# Patient Record
Sex: MALE | Race: WHITE | Employment: FULL TIME | URBAN - METROPOLITAN AREA
[De-identification: names, ages, dates, MRNs, and addresses within clinical notes are randomized per-mention and may not be internally consistent; named-entity substitution may affect disease eponyms.]

---

## 2017-02-13 RX ORDER — BENAZEPRIL HYDROCHLORIDE AND HYDROCHLOROTHIAZIDE 20; 12.5 MG/1; MG/1
TABLET ORAL
Qty: 180 TABLET | Refills: 1 | Status: SHIPPED | OUTPATIENT
Start: 2017-02-13 | End: 2017-06-14 | Stop reason: SDUPTHER

## 2017-03-09 ENCOUNTER — TELEPHONE (OUTPATIENT)
Dept: FAMILY MEDICINE CLINIC | Age: 67
End: 2017-03-09

## 2017-03-20 RX ORDER — TADALAFIL 20 MG/1
TABLET ORAL
Qty: 6 TABLET | Refills: 11 | Status: ON HOLD | OUTPATIENT
Start: 2017-03-20 | End: 2018-02-09 | Stop reason: CLARIF

## 2017-06-05 DIAGNOSIS — N52.9 ERECTILE DYSFUNCTION, UNSPECIFIED ERECTILE DYSFUNCTION TYPE: Primary | ICD-10-CM

## 2017-06-05 DIAGNOSIS — E11.8 TYPE 2 DIABETES MELLITUS WITH COMPLICATION, UNSPECIFIED LONG TERM INSULIN USE STATUS: ICD-10-CM

## 2017-06-05 DIAGNOSIS — I10 ESSENTIAL HYPERTENSION: ICD-10-CM

## 2017-06-05 DIAGNOSIS — N52.9 ERECTILE DYSFUNCTION, UNSPECIFIED ERECTILE DYSFUNCTION TYPE: ICD-10-CM

## 2017-06-05 LAB
ALBUMIN SERPL-MCNC: 4.7 G/DL (ref 3.9–4.9)
ALP BLD-CCNC: 65 U/L (ref 35–104)
ALT SERPL-CCNC: 28 U/L (ref 0–41)
ANION GAP SERPL CALCULATED.3IONS-SCNC: 15 MEQ/L (ref 7–13)
AST SERPL-CCNC: 35 U/L (ref 0–40)
BASOPHILS ABSOLUTE: 0.1 K/UL (ref 0–0.2)
BASOPHILS RELATIVE PERCENT: 1 %
BILIRUB SERPL-MCNC: 0.8 MG/DL (ref 0–1.2)
BUN BLDV-MCNC: 19 MG/DL (ref 8–23)
CALCIUM SERPL-MCNC: 9.3 MG/DL (ref 8.6–10.2)
CHLORIDE BLD-SCNC: 99 MEQ/L (ref 98–107)
CO2: 26 MEQ/L (ref 22–29)
CREAT SERPL-MCNC: 1.21 MG/DL (ref 0.7–1.2)
EOSINOPHILS ABSOLUTE: 0.6 K/UL (ref 0–0.7)
EOSINOPHILS RELATIVE PERCENT: 6.6 %
GFR AFRICAN AMERICAN: >60
GFR NON-AFRICAN AMERICAN: 60
GLOBULIN: 2.2 G/DL (ref 2.3–3.5)
GLUCOSE BLD-MCNC: 176 MG/DL (ref 74–109)
HBA1C MFR BLD: 7.6 % (ref 4.8–5.9)
HCT VFR BLD CALC: 38.6 % (ref 42–52)
HEMOGLOBIN: 12.8 G/DL (ref 14–18)
LYMPHOCYTES ABSOLUTE: 1 K/UL (ref 1–4.8)
LYMPHOCYTES RELATIVE PERCENT: 12.2 %
MCH RBC QN AUTO: 31.4 PG (ref 27–31.3)
MCHC RBC AUTO-ENTMCNC: 33.3 % (ref 33–37)
MCV RBC AUTO: 94.1 FL (ref 80–100)
MONOCYTES ABSOLUTE: 1.4 K/UL (ref 0.2–0.8)
MONOCYTES RELATIVE PERCENT: 15.9 %
NEUTROPHILS ABSOLUTE: 5.5 K/UL (ref 1.4–6.5)
NEUTROPHILS RELATIVE PERCENT: 64.3 %
PDW BLD-RTO: 14.2 % (ref 11.5–14.5)
PLATELET # BLD: 313 K/UL (ref 130–400)
POTASSIUM SERPL-SCNC: 4.5 MEQ/L (ref 3.5–5.1)
PROSTATE SPECIFIC ANTIGEN: 3.55 NG/ML (ref 0–5.4)
RBC # BLD: 4.09 M/UL (ref 4.7–6.1)
SODIUM BLD-SCNC: 140 MEQ/L (ref 132–144)
TOTAL PROTEIN: 6.9 G/DL (ref 6.4–8.1)
WBC # BLD: 8.5 K/UL (ref 4.8–10.8)

## 2017-06-14 ENCOUNTER — OFFICE VISIT (OUTPATIENT)
Dept: FAMILY MEDICINE CLINIC | Age: 67
End: 2017-06-14

## 2017-06-14 VITALS
HEART RATE: 91 BPM | WEIGHT: 231.25 LBS | BODY MASS INDEX: 34.25 KG/M2 | HEIGHT: 69 IN | TEMPERATURE: 97.2 F | DIASTOLIC BLOOD PRESSURE: 94 MMHG | RESPIRATION RATE: 16 BRPM | SYSTOLIC BLOOD PRESSURE: 160 MMHG

## 2017-06-14 DIAGNOSIS — Z00.00 ANNUAL PHYSICAL EXAM: Primary | ICD-10-CM

## 2017-06-14 DIAGNOSIS — I10 ESSENTIAL HYPERTENSION: ICD-10-CM

## 2017-06-14 DIAGNOSIS — N40.1 BENIGN PROSTATIC HYPERPLASIA WITH LOWER URINARY TRACT SYMPTOMS, UNSPECIFIED MORPHOLOGY: ICD-10-CM

## 2017-06-14 DIAGNOSIS — E78.2 MIXED HYPERLIPIDEMIA: ICD-10-CM

## 2017-06-14 DIAGNOSIS — J01.01 ACUTE RECURRENT MAXILLARY SINUSITIS: ICD-10-CM

## 2017-06-14 DIAGNOSIS — E11.9 TYPE 2 DIABETES MELLITUS WITHOUT COMPLICATION, WITHOUT LONG-TERM CURRENT USE OF INSULIN (HCC): ICD-10-CM

## 2017-06-14 PROCEDURE — 99397 PER PM REEVAL EST PAT 65+ YR: CPT | Performed by: FAMILY MEDICINE

## 2017-06-14 RX ORDER — BENAZEPRIL HYDROCHLORIDE AND HYDROCHLOROTHIAZIDE 20; 12.5 MG/1; MG/1
TABLET ORAL
Qty: 90 TABLET | Refills: 1 | Status: SHIPPED | OUTPATIENT
Start: 2017-06-14 | End: 2017-11-21

## 2017-06-14 RX ORDER — CEFDINIR 300 MG/1
600 CAPSULE ORAL DAILY
Qty: 20 CAPSULE | Refills: 0 | Status: SHIPPED | OUTPATIENT
Start: 2017-06-14 | End: 2017-06-24

## 2017-06-14 RX ORDER — ATORVASTATIN CALCIUM 10 MG/1
TABLET, FILM COATED ORAL
Qty: 90 TABLET | Refills: 1 | Status: SHIPPED | OUTPATIENT
Start: 2017-06-14 | End: 2018-03-29 | Stop reason: SDUPTHER

## 2017-06-14 RX ORDER — AMLODIPINE BESYLATE 5 MG/1
5 TABLET ORAL DAILY
Qty: 30 TABLET | Refills: 3 | Status: SHIPPED | OUTPATIENT
Start: 2017-06-14 | End: 2017-07-13 | Stop reason: SDUPTHER

## 2017-06-29 ENCOUNTER — NURSE ONLY (OUTPATIENT)
Dept: FAMILY MEDICINE CLINIC | Age: 67
End: 2017-06-29

## 2017-06-29 DIAGNOSIS — I10 ESSENTIAL HYPERTENSION: Primary | ICD-10-CM

## 2017-07-13 ENCOUNTER — OFFICE VISIT (OUTPATIENT)
Dept: FAMILY MEDICINE CLINIC | Age: 67
End: 2017-07-13

## 2017-07-13 VITALS
TEMPERATURE: 96.5 F | BODY MASS INDEX: 33.06 KG/M2 | HEART RATE: 87 BPM | WEIGHT: 223.19 LBS | SYSTOLIC BLOOD PRESSURE: 130 MMHG | DIASTOLIC BLOOD PRESSURE: 76 MMHG | OXYGEN SATURATION: 97 % | HEIGHT: 69 IN

## 2017-07-13 DIAGNOSIS — E11.9 TYPE 2 DIABETES MELLITUS WITHOUT COMPLICATION, WITHOUT LONG-TERM CURRENT USE OF INSULIN (HCC): ICD-10-CM

## 2017-07-13 DIAGNOSIS — I10 ESSENTIAL HYPERTENSION: Primary | ICD-10-CM

## 2017-07-13 LAB
CREATININE URINE: 222.5 MG/DL
MICROALBUMIN UR-MCNC: <1.2 MG/DL
MICROALBUMIN/CREAT UR-RTO: NORMAL MG/G (ref 0–30)

## 2017-07-13 PROCEDURE — 3046F HEMOGLOBIN A1C LEVEL >9.0%: CPT | Performed by: FAMILY MEDICINE

## 2017-07-13 PROCEDURE — 3017F COLORECTAL CA SCREEN DOC REV: CPT | Performed by: FAMILY MEDICINE

## 2017-07-13 PROCEDURE — 1036F TOBACCO NON-USER: CPT | Performed by: FAMILY MEDICINE

## 2017-07-13 PROCEDURE — 99213 OFFICE O/P EST LOW 20 MIN: CPT | Performed by: FAMILY MEDICINE

## 2017-07-13 PROCEDURE — G8417 CALC BMI ABV UP PARAM F/U: HCPCS | Performed by: FAMILY MEDICINE

## 2017-07-13 PROCEDURE — 4040F PNEUMOC VAC/ADMIN/RCVD: CPT | Performed by: FAMILY MEDICINE

## 2017-07-13 PROCEDURE — G8427 DOCREV CUR MEDS BY ELIG CLIN: HCPCS | Performed by: FAMILY MEDICINE

## 2017-07-13 PROCEDURE — 1123F ACP DISCUSS/DSCN MKR DOCD: CPT | Performed by: FAMILY MEDICINE

## 2017-07-13 RX ORDER — AMLODIPINE BESYLATE 5 MG/1
5 TABLET ORAL DAILY
Qty: 90 TABLET | Refills: 1 | Status: SHIPPED | OUTPATIENT
Start: 2017-07-13 | End: 2018-02-18 | Stop reason: SDUPTHER

## 2017-07-13 ASSESSMENT — PATIENT HEALTH QUESTIONNAIRE - PHQ9
1. LITTLE INTEREST OR PLEASURE IN DOING THINGS: 0
SUM OF ALL RESPONSES TO PHQ9 QUESTIONS 1 & 2: 0
SUM OF ALL RESPONSES TO PHQ QUESTIONS 1-9: 0
2. FEELING DOWN, DEPRESSED OR HOPELESS: 0

## 2017-11-09 ENCOUNTER — NURSE ONLY (OUTPATIENT)
Dept: PRIMARY CARE CLINIC | Age: 67
End: 2017-11-09

## 2017-11-09 DIAGNOSIS — E11.9 TYPE 2 DIABETES MELLITUS WITHOUT COMPLICATION, WITHOUT LONG-TERM CURRENT USE OF INSULIN (HCC): ICD-10-CM

## 2017-11-09 DIAGNOSIS — I10 ESSENTIAL HYPERTENSION: Primary | ICD-10-CM

## 2017-11-09 DIAGNOSIS — I10 ESSENTIAL HYPERTENSION: ICD-10-CM

## 2017-11-09 LAB
ANION GAP SERPL CALCULATED.3IONS-SCNC: 20 MEQ/L (ref 7–13)
BUN BLDV-MCNC: 19 MG/DL (ref 8–23)
CALCIUM SERPL-MCNC: 9.9 MG/DL (ref 8.6–10.2)
CHLORIDE BLD-SCNC: 99 MEQ/L (ref 98–107)
CO2: 25 MEQ/L (ref 22–29)
CREAT SERPL-MCNC: 1.31 MG/DL (ref 0.7–1.2)
GFR AFRICAN AMERICAN: >60
GFR NON-AFRICAN AMERICAN: 54.6
GLUCOSE BLD-MCNC: 129 MG/DL (ref 74–109)
HBA1C MFR BLD: 6.5 % (ref 4.8–5.9)
POTASSIUM SERPL-SCNC: 5.1 MEQ/L (ref 3.5–5.1)
SODIUM BLD-SCNC: 144 MEQ/L (ref 132–144)

## 2017-11-21 ENCOUNTER — OFFICE VISIT (OUTPATIENT)
Dept: FAMILY MEDICINE CLINIC | Age: 67
End: 2017-11-21

## 2017-11-21 VITALS
TEMPERATURE: 97.7 F | HEART RATE: 102 BPM | BODY MASS INDEX: 32.18 KG/M2 | RESPIRATION RATE: 12 BRPM | WEIGHT: 217.25 LBS | HEIGHT: 69 IN | SYSTOLIC BLOOD PRESSURE: 136 MMHG | OXYGEN SATURATION: 97 % | DIASTOLIC BLOOD PRESSURE: 84 MMHG

## 2017-11-21 DIAGNOSIS — E78.00 PURE HYPERCHOLESTEROLEMIA: ICD-10-CM

## 2017-11-21 DIAGNOSIS — E11.9 TYPE 2 DIABETES MELLITUS WITHOUT COMPLICATION, WITHOUT LONG-TERM CURRENT USE OF INSULIN (HCC): Primary | ICD-10-CM

## 2017-11-21 DIAGNOSIS — I10 ESSENTIAL HYPERTENSION: ICD-10-CM

## 2017-11-21 PROCEDURE — G8427 DOCREV CUR MEDS BY ELIG CLIN: HCPCS | Performed by: FAMILY MEDICINE

## 2017-11-21 PROCEDURE — 1036F TOBACCO NON-USER: CPT | Performed by: FAMILY MEDICINE

## 2017-11-21 PROCEDURE — 1123F ACP DISCUSS/DSCN MKR DOCD: CPT | Performed by: FAMILY MEDICINE

## 2017-11-21 PROCEDURE — 3017F COLORECTAL CA SCREEN DOC REV: CPT | Performed by: FAMILY MEDICINE

## 2017-11-21 PROCEDURE — 4040F PNEUMOC VAC/ADMIN/RCVD: CPT | Performed by: FAMILY MEDICINE

## 2017-11-21 PROCEDURE — 99213 OFFICE O/P EST LOW 20 MIN: CPT | Performed by: FAMILY MEDICINE

## 2017-11-21 PROCEDURE — 3044F HG A1C LEVEL LT 7.0%: CPT | Performed by: FAMILY MEDICINE

## 2017-11-21 PROCEDURE — G8417 CALC BMI ABV UP PARAM F/U: HCPCS | Performed by: FAMILY MEDICINE

## 2017-11-21 PROCEDURE — G8484 FLU IMMUNIZE NO ADMIN: HCPCS | Performed by: FAMILY MEDICINE

## 2017-11-21 RX ORDER — BENAZEPRIL HYDROCHLORIDE 20 MG/1
20 TABLET ORAL DAILY
Qty: 90 TABLET | Refills: 1 | Status: SHIPPED | OUTPATIENT
Start: 2017-11-21 | End: 2018-05-18 | Stop reason: SDUPTHER

## 2017-12-18 DIAGNOSIS — I10 ESSENTIAL HYPERTENSION: ICD-10-CM

## 2017-12-18 LAB
ANION GAP SERPL CALCULATED.3IONS-SCNC: 14 MEQ/L (ref 7–13)
BUN BLDV-MCNC: 20 MG/DL (ref 8–23)
CALCIUM SERPL-MCNC: 9.7 MG/DL (ref 8.6–10.2)
CHLORIDE BLD-SCNC: 102 MEQ/L (ref 98–107)
CO2: 26 MEQ/L (ref 22–29)
CREAT SERPL-MCNC: 1.15 MG/DL (ref 0.7–1.2)
GFR AFRICAN AMERICAN: >60
GFR NON-AFRICAN AMERICAN: >60
GLUCOSE BLD-MCNC: 139 MG/DL (ref 74–109)
POTASSIUM SERPL-SCNC: 5 MEQ/L (ref 3.5–5.1)
SODIUM BLD-SCNC: 142 MEQ/L (ref 132–144)

## 2018-01-26 ENCOUNTER — TELEPHONE (OUTPATIENT)
Dept: ENDOSCOPY | Age: 68
End: 2018-01-26

## 2018-02-09 ENCOUNTER — HOSPITAL ENCOUNTER (OUTPATIENT)
Age: 68
Setting detail: OUTPATIENT SURGERY
Discharge: HOME | End: 2018-02-09
Attending: SPECIALIST | Admitting: SPECIALIST
Payer: COMMERCIAL

## 2018-02-09 ENCOUNTER — ANESTHESIA EVENT (OUTPATIENT)
Dept: ENDOSCOPY | Age: 68
End: 2018-02-09
Payer: COMMERCIAL

## 2018-02-09 ENCOUNTER — ANESTHESIA (OUTPATIENT)
Dept: ENDOSCOPY | Age: 68
End: 2018-02-09
Payer: COMMERCIAL

## 2018-02-09 VITALS
DIASTOLIC BLOOD PRESSURE: 79 MMHG | TEMPERATURE: 98.2 F | SYSTOLIC BLOOD PRESSURE: 134 MMHG | WEIGHT: 217 LBS | BODY MASS INDEX: 32.14 KG/M2 | RESPIRATION RATE: 16 BRPM | OXYGEN SATURATION: 97 % | HEART RATE: 82 BPM | HEIGHT: 69 IN

## 2018-02-09 VITALS
OXYGEN SATURATION: 98 % | DIASTOLIC BLOOD PRESSURE: 55 MMHG | RESPIRATION RATE: 13 BRPM | SYSTOLIC BLOOD PRESSURE: 98 MMHG

## 2018-02-09 PROCEDURE — 6360000002 HC RX W HCPCS

## 2018-02-09 PROCEDURE — 3609027000 HC COLONOSCOPY: Performed by: SPECIALIST

## 2018-02-09 PROCEDURE — 7100000010 HC PHASE II RECOVERY - FIRST 15 MIN: Performed by: SPECIALIST

## 2018-02-09 PROCEDURE — 3700000000 HC ANESTHESIA ATTENDED CARE: Performed by: SPECIALIST

## 2018-02-09 PROCEDURE — 7100000011 HC PHASE II RECOVERY - ADDTL 15 MIN: Performed by: SPECIALIST

## 2018-02-09 PROCEDURE — 3700000001 HC ADD 15 MINUTES (ANESTHESIA): Performed by: SPECIALIST

## 2018-02-09 RX ORDER — ONDANSETRON 2 MG/ML
4 INJECTION INTRAMUSCULAR; INTRAVENOUS
Status: DISCONTINUED | OUTPATIENT
Start: 2018-02-09 | End: 2018-02-09 | Stop reason: HOSPADM

## 2018-02-09 RX ORDER — SODIUM CHLORIDE 0.9 % (FLUSH) 0.9 %
10 SYRINGE (ML) INJECTION EVERY 12 HOURS SCHEDULED
Status: DISCONTINUED | OUTPATIENT
Start: 2018-02-09 | End: 2018-02-09 | Stop reason: HOSPADM

## 2018-02-09 RX ORDER — SODIUM CHLORIDE 9 MG/ML
INJECTION, SOLUTION INTRAVENOUS CONTINUOUS
Status: DISCONTINUED | OUTPATIENT
Start: 2018-02-09 | End: 2018-02-09 | Stop reason: HOSPADM

## 2018-02-09 RX ORDER — LIDOCAINE HYDROCHLORIDE 10 MG/ML
1 INJECTION, SOLUTION EPIDURAL; INFILTRATION; INTRACAUDAL; PERINEURAL
Status: DISCONTINUED | OUTPATIENT
Start: 2018-02-09 | End: 2018-02-09 | Stop reason: HOSPADM

## 2018-02-09 RX ORDER — PROPOFOL 10 MG/ML
INJECTION, EMULSION INTRAVENOUS CONTINUOUS PRN
Status: DISCONTINUED | OUTPATIENT
Start: 2018-02-09 | End: 2018-02-09 | Stop reason: SDUPTHER

## 2018-02-09 RX ORDER — SODIUM CHLORIDE 0.9 % (FLUSH) 0.9 %
10 SYRINGE (ML) INJECTION PRN
Status: DISCONTINUED | OUTPATIENT
Start: 2018-02-09 | End: 2018-02-09 | Stop reason: HOSPADM

## 2018-02-09 RX ADMIN — PROPOFOL 100 MCG/KG/MIN: 10 INJECTION, EMULSION INTRAVENOUS at 08:09

## 2018-02-09 NOTE — ANESTHESIA PRE PROCEDURE
PF 1 % injection 1 mL  1 mL Intradermal Once PRN Hattie Lofton MD           Allergies:  No Known Allergies    Problem List:    Patient Active Problem List   Diagnosis Code    Hyperlipidemia E78.5    Erectile dysfunction N52.9    Benign prostatic hyperplasia N40.0    Essential hypertension I10    Type 2 diabetes mellitus without complication, without long-term current use of insulin [facility] E11.9       Past Medical History:        Diagnosis Date    BPH (benign prostatic hypertrophy)     Erectile dysfunction     Hyperlipidemia     Hypertension     Type II or unspecified type diabetes mellitus without mention of complication, not stated as uncontrolled        Past Surgical History:        Procedure Laterality Date    FRACTURE SURGERY      right wrist    WISDOM TOOTH EXTRACTION         Social History:    Social History   Substance Use Topics    Smoking status: Former Smoker     Quit date: 1978    Smokeless tobacco: Never Used    Alcohol use No                                Counseling given: Not Answered      Vital Signs (Current):   Vitals:    02/09/18 0717   BP: (!) 194/96   Pulse: 108   Resp: 18   Temp: 36.8 °C (98.2 °F)   TempSrc: Temporal   SpO2: 97%   Weight: 217 lb (98.4 kg)   Height: 5' 9\" (1.753 m)                                              BP Readings from Last 3 Encounters:   02/09/18 (!) 194/96   11/21/17 136/84   07/13/17 130/76       NPO Status: Time of last liquid consumption: 2300                        Time of last solid consumption: 2000                        Date of last liquid consumption: 02/08/18                        Date of last solid food consumption: 02/07/18    BMI:   Wt Readings from Last 3 Encounters:   02/09/18 217 lb (98.4 kg)   11/21/17 217 lb 4 oz (98.5 kg)   07/13/17 223 lb 3 oz (101.2 kg)     Body mass index is 32.05 kg/m².     CBC:   Lab Results   Component Value Date    WBC 8.5 06/05/2017    RBC 4.09 06/05/2017    HGB 12.8 06/05/2017    HCT 38.6 06/05/2017    MCV

## 2018-02-19 RX ORDER — AMLODIPINE BESYLATE 5 MG/1
TABLET ORAL
Qty: 30 TABLET | Refills: 2 | Status: SHIPPED | OUTPATIENT
Start: 2018-02-19 | End: 2018-05-18 | Stop reason: SDUPTHER

## 2018-02-26 ENCOUNTER — OFFICE VISIT (OUTPATIENT)
Dept: FAMILY MEDICINE CLINIC | Age: 68
End: 2018-02-26
Payer: COMMERCIAL

## 2018-02-26 VITALS
OXYGEN SATURATION: 96 % | RESPIRATION RATE: 12 BRPM | HEART RATE: 105 BPM | DIASTOLIC BLOOD PRESSURE: 84 MMHG | HEIGHT: 69 IN | TEMPERATURE: 98.1 F | BODY MASS INDEX: 33.62 KG/M2 | SYSTOLIC BLOOD PRESSURE: 134 MMHG | WEIGHT: 227 LBS

## 2018-02-26 DIAGNOSIS — L24.1 IRRITANT CONTACT DERMATITIS DUE TO OILS: ICD-10-CM

## 2018-02-26 DIAGNOSIS — I10 ESSENTIAL HYPERTENSION: Primary | ICD-10-CM

## 2018-02-26 DIAGNOSIS — E78.00 PURE HYPERCHOLESTEROLEMIA: ICD-10-CM

## 2018-02-26 PROCEDURE — 96372 THER/PROPH/DIAG INJ SC/IM: CPT | Performed by: FAMILY MEDICINE

## 2018-02-26 PROCEDURE — 99213 OFFICE O/P EST LOW 20 MIN: CPT | Performed by: FAMILY MEDICINE

## 2018-02-26 RX ORDER — DESOXIMETASONE 2.5 MG/G
CREAM TOPICAL
Qty: 30 G | Refills: 2 | Status: SHIPPED | OUTPATIENT
Start: 2018-02-26 | End: 2019-01-16 | Stop reason: ALTCHOICE

## 2018-02-26 RX ORDER — TRIAMCINOLONE ACETONIDE 40 MG/ML
80 INJECTION, SUSPENSION INTRA-ARTICULAR; INTRAMUSCULAR ONCE
Status: COMPLETED | OUTPATIENT
Start: 2018-02-26 | End: 2018-02-26

## 2018-02-26 RX ORDER — CEPHALEXIN 500 MG/1
500 CAPSULE ORAL 3 TIMES DAILY
Qty: 30 CAPSULE | Refills: 0 | Status: SHIPPED | OUTPATIENT
Start: 2018-02-26 | End: 2018-06-27 | Stop reason: ALTCHOICE

## 2018-02-26 RX ADMIN — TRIAMCINOLONE ACETONIDE 80 MG: 40 INJECTION, SUSPENSION INTRA-ARTICULAR; INTRAMUSCULAR at 17:16

## 2018-02-26 NOTE — PROGRESS NOTES
neg. Nares clear, no drainage noted  Neck supple/ no adenopathy   HEART:  RRR without murmur/ no carotid bruits  LUNGS:  Clear to auscultation bilaterally, no wheeze or rhonchi noted  THYROID: neg masses or nodularity  ABDOMEN:  Soft x4. Bowel sounds positive. No masses or organomegaly,  Negative tenderness, guarding or rebound. EXTR:  Without edema./ good pulses bilat    Neurologic exam unremarkable. DTRs in upper and lower extremities within normal limits. Full strength noted    Skin-  positive papules in between his fingers bilateral hands also some erythema and some oozing consistent with contact       Assessment:      1. Pure hypercholesterolemia     2. Essential hypertension     3. Irritant contact dermatitis due to oils               Plan:        Orders Placed This Encounter   Medications    triamcinolone acetonide (KENALOG-40) injection 80 mg    desoximetasone (TOPICORT) 0.25 % cream     Sig: Apply topically 2 times daily. Dispense:  30 g     Refill:  2    cephALEXin (KEFLEX) 500 MG capsule     Sig: Take 1 capsule by mouth 3 times daily     Dispense:  30 capsule     Refill:  0     No orders of the defined types were placed in this encounter.           Health Maintenance Due   Topic Date Due    AAA screen  1950    Hepatitis C screen  1950    Shingles Vaccine (1 of 2 - 2 Dose Series) 03/24/2001    DTaP/Tdap/Td vaccine (1 - Tdap) 12/11/2008    Pneumococcal low/med risk (1 of 2 - PCV13) 03/24/2016    Lipid screen  06/15/2017    Diabetic foot exam  06/22/2017    Colon Cancer Screen FIT/FOBT  07/01/2017    Flu vaccine (1) 09/01/2017             Controlled Substances Monitoring:              He will go ahead and keep this area clean and dry use the Topicort steroid twice a day we given a shot here today to get things better because of a mild secondary infection we'll start Keflex                       If anything worsens or changes please call us at once , follow-up in the office as

## 2018-03-29 RX ORDER — ATORVASTATIN CALCIUM 10 MG/1
TABLET, FILM COATED ORAL
Qty: 30 TABLET | Refills: 0 | Status: SHIPPED | OUTPATIENT
Start: 2018-03-29 | End: 2018-05-08 | Stop reason: SDUPTHER

## 2018-05-09 RX ORDER — ATORVASTATIN CALCIUM 10 MG/1
TABLET, FILM COATED ORAL
Qty: 30 TABLET | Refills: 2 | Status: SHIPPED | OUTPATIENT
Start: 2018-05-09 | End: 2018-06-05 | Stop reason: SDUPTHER

## 2018-05-18 ENCOUNTER — TELEPHONE (OUTPATIENT)
Dept: FAMILY MEDICINE CLINIC | Age: 68
End: 2018-05-18

## 2018-05-19 RX ORDER — BENAZEPRIL HYDROCHLORIDE 20 MG/1
20 TABLET ORAL DAILY
Qty: 90 TABLET | Refills: 0 | Status: SHIPPED | OUTPATIENT
Start: 2018-05-19 | End: 2018-10-10 | Stop reason: SDUPTHER

## 2018-05-19 RX ORDER — AMLODIPINE BESYLATE 5 MG/1
TABLET ORAL
Qty: 90 TABLET | Refills: 0 | Status: SHIPPED | OUTPATIENT
Start: 2018-05-19 | End: 2018-10-22 | Stop reason: SDUPTHER

## 2018-05-23 ENCOUNTER — TELEPHONE (OUTPATIENT)
Dept: FAMILY MEDICINE CLINIC | Age: 68
End: 2018-05-23

## 2018-05-31 DIAGNOSIS — E11.9 TYPE 2 DIABETES MELLITUS WITHOUT COMPLICATION, WITHOUT LONG-TERM CURRENT USE OF INSULIN (HCC): Primary | ICD-10-CM

## 2018-06-05 DIAGNOSIS — E11.9 TYPE 2 DIABETES MELLITUS WITHOUT COMPLICATION, WITHOUT LONG-TERM CURRENT USE OF INSULIN (HCC): ICD-10-CM

## 2018-06-06 RX ORDER — ATORVASTATIN CALCIUM 10 MG/1
TABLET, FILM COATED ORAL
Qty: 90 TABLET | Refills: 2 | Status: SHIPPED | OUTPATIENT
Start: 2018-06-06 | End: 2019-01-31 | Stop reason: SDUPTHER

## 2018-06-27 ENCOUNTER — OFFICE VISIT (OUTPATIENT)
Dept: FAMILY MEDICINE CLINIC | Age: 68
End: 2018-06-27
Payer: COMMERCIAL

## 2018-06-27 VITALS
TEMPERATURE: 97.9 F | BODY MASS INDEX: 33.41 KG/M2 | SYSTOLIC BLOOD PRESSURE: 136 MMHG | OXYGEN SATURATION: 98 % | HEART RATE: 90 BPM | RESPIRATION RATE: 12 BRPM | HEIGHT: 69 IN | DIASTOLIC BLOOD PRESSURE: 84 MMHG | WEIGHT: 225.56 LBS

## 2018-06-27 DIAGNOSIS — Z12.5 SCREENING PSA (PROSTATE SPECIFIC ANTIGEN): ICD-10-CM

## 2018-06-27 DIAGNOSIS — E78.00 PURE HYPERCHOLESTEROLEMIA: ICD-10-CM

## 2018-06-27 DIAGNOSIS — I10 ESSENTIAL HYPERTENSION: ICD-10-CM

## 2018-06-27 DIAGNOSIS — Z00.00 ANNUAL PHYSICAL EXAM: Primary | ICD-10-CM

## 2018-06-27 DIAGNOSIS — Z23 NEED FOR TDAP VACCINATION: ICD-10-CM

## 2018-06-27 DIAGNOSIS — E11.9 TYPE 2 DIABETES MELLITUS WITHOUT COMPLICATION, WITHOUT LONG-TERM CURRENT USE OF INSULIN (HCC): ICD-10-CM

## 2018-06-27 PROCEDURE — 90715 TDAP VACCINE 7 YRS/> IM: CPT | Performed by: FAMILY MEDICINE

## 2018-06-27 PROCEDURE — 99397 PER PM REEVAL EST PAT 65+ YR: CPT | Performed by: FAMILY MEDICINE

## 2018-06-27 PROCEDURE — 90471 IMMUNIZATION ADMIN: CPT | Performed by: FAMILY MEDICINE

## 2018-07-19 DIAGNOSIS — Z12.5 SCREENING PSA (PROSTATE SPECIFIC ANTIGEN): ICD-10-CM

## 2018-07-19 DIAGNOSIS — Z00.00 ANNUAL PHYSICAL EXAM: ICD-10-CM

## 2018-07-19 DIAGNOSIS — E11.9 TYPE 2 DIABETES MELLITUS WITHOUT COMPLICATION, WITHOUT LONG-TERM CURRENT USE OF INSULIN (HCC): ICD-10-CM

## 2018-07-19 LAB
ALBUMIN SERPL-MCNC: 4.4 G/DL (ref 3.9–4.9)
ALP BLD-CCNC: 58 U/L (ref 35–104)
ALT SERPL-CCNC: 20 U/L (ref 0–41)
ANION GAP SERPL CALCULATED.3IONS-SCNC: 12 MEQ/L (ref 7–13)
AST SERPL-CCNC: 19 U/L (ref 0–40)
BASOPHILS ABSOLUTE: 0.1 K/UL (ref 0–0.2)
BASOPHILS RELATIVE PERCENT: 1.2 %
BILIRUB SERPL-MCNC: 0.6 MG/DL (ref 0–1.2)
BUN BLDV-MCNC: 15 MG/DL (ref 8–23)
CALCIUM SERPL-MCNC: 9.3 MG/DL (ref 8.6–10.2)
CHLORIDE BLD-SCNC: 101 MEQ/L (ref 98–107)
CHOLESTEROL, TOTAL: 183 MG/DL (ref 0–199)
CO2: 27 MEQ/L (ref 22–29)
CREAT SERPL-MCNC: 1.05 MG/DL (ref 0.7–1.2)
CREATININE URINE: 127.9 MG/DL
EOSINOPHILS ABSOLUTE: 0.2 K/UL (ref 0–0.7)
EOSINOPHILS RELATIVE PERCENT: 3.4 %
GFR AFRICAN AMERICAN: >60
GFR NON-AFRICAN AMERICAN: >60
GLOBULIN: 2.7 G/DL (ref 2.3–3.5)
GLUCOSE BLD-MCNC: 131 MG/DL (ref 74–109)
HBA1C MFR BLD: 6.3 % (ref 4.8–5.9)
HCT VFR BLD CALC: 41.4 % (ref 42–52)
HDLC SERPL-MCNC: 64 MG/DL (ref 40–59)
HEMOGLOBIN: 13.8 G/DL (ref 14–18)
LDL CHOLESTEROL CALCULATED: 95 MG/DL (ref 0–129)
LYMPHOCYTES ABSOLUTE: 1.2 K/UL (ref 1–4.8)
LYMPHOCYTES RELATIVE PERCENT: 19 %
MCH RBC QN AUTO: 32.2 PG (ref 27–31.3)
MCHC RBC AUTO-ENTMCNC: 33.4 % (ref 33–37)
MCV RBC AUTO: 96.2 FL (ref 80–100)
MICROALBUMIN UR-MCNC: <1.2 MG/DL
MICROALBUMIN/CREAT UR-RTO: NORMAL MG/G (ref 0–30)
MONOCYTES ABSOLUTE: 0.6 K/UL (ref 0.2–0.8)
MONOCYTES RELATIVE PERCENT: 9.8 %
NEUTROPHILS ABSOLUTE: 4.2 K/UL (ref 1.4–6.5)
NEUTROPHILS RELATIVE PERCENT: 66.6 %
PDW BLD-RTO: 13.4 % (ref 11.5–14.5)
PLATELET # BLD: 343 K/UL (ref 130–400)
POTASSIUM SERPL-SCNC: 4.7 MEQ/L (ref 3.5–5.1)
PROSTATE SPECIFIC ANTIGEN: 4.96 NG/ML (ref 0–5.4)
RBC # BLD: 4.31 M/UL (ref 4.7–6.1)
SODIUM BLD-SCNC: 140 MEQ/L (ref 132–144)
TOTAL PROTEIN: 7.1 G/DL (ref 6.4–8.1)
TRIGL SERPL-MCNC: 121 MG/DL (ref 0–200)
WBC # BLD: 6.3 K/UL (ref 4.8–10.8)

## 2018-10-10 DIAGNOSIS — I10 ESSENTIAL HYPERTENSION: Primary | ICD-10-CM

## 2018-10-11 RX ORDER — BENAZEPRIL HYDROCHLORIDE 20 MG/1
TABLET ORAL
Qty: 90 TABLET | Refills: 0 | Status: SHIPPED | OUTPATIENT
Start: 2018-10-11 | End: 2019-01-31 | Stop reason: SDUPTHER

## 2018-10-22 RX ORDER — AMLODIPINE BESYLATE 5 MG/1
TABLET ORAL
Qty: 90 TABLET | Refills: 0 | Status: SHIPPED | OUTPATIENT
Start: 2018-10-22 | End: 2019-01-16 | Stop reason: DRUGHIGH

## 2018-12-06 RX ORDER — LANCETS
EACH MISCELLANEOUS
Qty: 100 EACH | Refills: 11 | Status: SHIPPED | OUTPATIENT
Start: 2018-12-06

## 2018-12-10 NOTE — TELEPHONE ENCOUNTER
Pt is calling because he needs a rx for his One touch ultra soft test strips sent to [facility] (tests 1-2 times per day)  The lancets were sent to [facility] but he needs the test strips sent to [facility]        Medication: one touch ultra test strips    Last Office Visit: 6/27/18    Last Labs: 7/19/18    Last Filled: 7/13/17

## 2019-01-16 ENCOUNTER — OFFICE VISIT (OUTPATIENT)
Dept: FAMILY MEDICINE CLINIC | Age: 69
End: 2019-01-16
Payer: MEDICARE

## 2019-01-16 VITALS
RESPIRATION RATE: 18 BRPM | BODY MASS INDEX: 33.43 KG/M2 | OXYGEN SATURATION: 96 % | SYSTOLIC BLOOD PRESSURE: 168 MMHG | HEART RATE: 86 BPM | WEIGHT: 225.7 LBS | TEMPERATURE: 97.8 F | DIASTOLIC BLOOD PRESSURE: 78 MMHG | HEIGHT: 69 IN

## 2019-01-16 DIAGNOSIS — R05.9 COUGH: ICD-10-CM

## 2019-01-16 DIAGNOSIS — J98.8 CONGESTION OF UPPER AIRWAY: Primary | ICD-10-CM

## 2019-01-16 DIAGNOSIS — J01.90 ACUTE BACTERIAL SINUSITIS: ICD-10-CM

## 2019-01-16 DIAGNOSIS — B96.89 ACUTE BACTERIAL SINUSITIS: ICD-10-CM

## 2019-01-16 PROCEDURE — 99213 OFFICE O/P EST LOW 20 MIN: CPT | Performed by: FAMILY MEDICINE

## 2019-01-16 RX ORDER — CODEINE PHOSPHATE AND GUAIFENESIN 10; 100 MG/5ML; MG/5ML
10 SOLUTION ORAL 4 TIMES DAILY PRN
Qty: 240 ML | Refills: 1 | Status: SHIPPED | OUTPATIENT
Start: 2019-01-16 | End: 2019-01-30

## 2019-01-16 RX ORDER — AMLODIPINE BESYLATE 10 MG/1
TABLET ORAL
Qty: 90 TABLET | Refills: 1 | Status: SHIPPED
Start: 2019-01-16 | End: 2019-01-31 | Stop reason: SDUPTHER

## 2019-01-16 RX ORDER — AMOXICILLIN 875 MG/1
875 TABLET, COATED ORAL 2 TIMES DAILY
Qty: 20 TABLET | Refills: 0 | Status: SHIPPED | OUTPATIENT
Start: 2019-01-16 | End: 2019-01-26

## 2019-01-16 ASSESSMENT — PATIENT HEALTH QUESTIONNAIRE - PHQ9
1. LITTLE INTEREST OR PLEASURE IN DOING THINGS: 0
SUM OF ALL RESPONSES TO PHQ9 QUESTIONS 1 & 2: 0
SUM OF ALL RESPONSES TO PHQ QUESTIONS 1-9: 0
2. FEELING DOWN, DEPRESSED OR HOPELESS: 0
SUM OF ALL RESPONSES TO PHQ QUESTIONS 1-9: 0

## 2019-01-30 ENCOUNTER — NURSE ONLY (OUTPATIENT)
Dept: FAMILY MEDICINE CLINIC | Age: 69
End: 2019-01-30
Payer: MEDICARE

## 2019-01-30 ENCOUNTER — TELEPHONE (OUTPATIENT)
Dept: FAMILY MEDICINE CLINIC | Age: 69
End: 2019-01-30

## 2019-01-30 DIAGNOSIS — E11.9 TYPE 2 DIABETES MELLITUS WITHOUT COMPLICATION, WITHOUT LONG-TERM CURRENT USE OF INSULIN (HCC): Primary | ICD-10-CM

## 2019-01-30 LAB — HBA1C MFR BLD: 6.1 %

## 2019-01-30 PROCEDURE — 83036 HEMOGLOBIN GLYCOSYLATED A1C: CPT | Performed by: FAMILY MEDICINE

## 2019-01-31 DIAGNOSIS — I10 ESSENTIAL HYPERTENSION: ICD-10-CM

## 2019-02-01 RX ORDER — BENAZEPRIL HYDROCHLORIDE 20 MG/1
20 TABLET ORAL DAILY
Qty: 90 TABLET | Refills: 0 | Status: SHIPPED | OUTPATIENT
Start: 2019-02-01 | End: 2019-02-07 | Stop reason: SDUPTHER

## 2019-02-01 RX ORDER — AMLODIPINE BESYLATE 10 MG/1
TABLET ORAL
Qty: 90 TABLET | Refills: 0 | Status: SHIPPED | OUTPATIENT
Start: 2019-02-01 | End: 2019-02-07 | Stop reason: SDUPTHER

## 2019-02-01 RX ORDER — ATORVASTATIN CALCIUM 10 MG/1
TABLET, FILM COATED ORAL
Qty: 90 TABLET | Refills: 0 | Status: SHIPPED | OUTPATIENT
Start: 2019-02-01 | End: 2019-02-07 | Stop reason: SDUPTHER

## 2019-02-07 DIAGNOSIS — I10 ESSENTIAL HYPERTENSION: ICD-10-CM

## 2019-02-07 DIAGNOSIS — E11.9 TYPE 2 DIABETES MELLITUS WITHOUT COMPLICATION, WITHOUT LONG-TERM CURRENT USE OF INSULIN (HCC): ICD-10-CM

## 2019-02-07 RX ORDER — BENAZEPRIL HYDROCHLORIDE 20 MG/1
20 TABLET ORAL DAILY
Qty: 30 TABLET | Refills: 0 | Status: SHIPPED | OUTPATIENT
Start: 2019-02-07

## 2019-02-07 RX ORDER — ATORVASTATIN CALCIUM 10 MG/1
TABLET, FILM COATED ORAL
Qty: 30 TABLET | Refills: 0 | Status: SHIPPED | OUTPATIENT
Start: 2019-02-07

## 2019-02-07 RX ORDER — AMLODIPINE BESYLATE 10 MG/1
TABLET ORAL
Qty: 30 TABLET | Refills: 0 | Status: SHIPPED | OUTPATIENT
Start: 2019-02-07

## 2023-05-26 DIAGNOSIS — I10 PRIMARY HYPERTENSION: Primary | ICD-10-CM

## 2023-05-26 DIAGNOSIS — E11.8 CONTROLLED TYPE 2 DIABETES MELLITUS WITH COMPLICATION, WITHOUT LONG-TERM CURRENT USE OF INSULIN (MULTI): ICD-10-CM

## 2023-06-01 RX ORDER — ATORVASTATIN CALCIUM 20 MG/1
TABLET, FILM COATED ORAL
Qty: 90 TABLET | Refills: 0 | Status: SHIPPED | OUTPATIENT
Start: 2023-06-01 | End: 2023-09-05 | Stop reason: SDUPTHER

## 2023-06-01 RX ORDER — AMLODIPINE BESYLATE 10 MG/1
TABLET ORAL
Qty: 90 TABLET | Refills: 0 | Status: SHIPPED | OUTPATIENT
Start: 2023-06-01 | End: 2023-09-05 | Stop reason: SDUPTHER

## 2023-06-01 RX ORDER — METFORMIN HYDROCHLORIDE 500 MG/1
TABLET, EXTENDED RELEASE ORAL
Qty: 180 TABLET | Refills: 0 | Status: SHIPPED | OUTPATIENT
Start: 2023-06-01 | End: 2023-09-05 | Stop reason: SDUPTHER

## 2023-06-01 RX ORDER — BENAZEPRIL HYDROCHLORIDE 20 MG/1
TABLET ORAL
Qty: 180 TABLET | Refills: 0 | Status: SHIPPED | OUTPATIENT
Start: 2023-06-01 | End: 2023-09-05 | Stop reason: SDUPTHER

## 2023-06-05 ENCOUNTER — OFFICE VISIT (OUTPATIENT)
Dept: PRIMARY CARE | Facility: CLINIC | Age: 73
End: 2023-06-05
Payer: MEDICARE

## 2023-06-05 ENCOUNTER — TELEPHONE (OUTPATIENT)
Dept: PRIMARY CARE | Facility: CLINIC | Age: 73
End: 2023-06-05

## 2023-06-05 VITALS
RESPIRATION RATE: 16 BRPM | TEMPERATURE: 97.5 F | HEIGHT: 69 IN | BODY MASS INDEX: 32.58 KG/M2 | SYSTOLIC BLOOD PRESSURE: 158 MMHG | OXYGEN SATURATION: 98 % | HEART RATE: 85 BPM | DIASTOLIC BLOOD PRESSURE: 72 MMHG | WEIGHT: 220 LBS

## 2023-06-05 DIAGNOSIS — R97.20 ELEVATED PSA: ICD-10-CM

## 2023-06-05 DIAGNOSIS — E78.2 MIXED HYPERLIPIDEMIA: ICD-10-CM

## 2023-06-05 DIAGNOSIS — Z23 ENCOUNTER FOR IMMUNIZATION: ICD-10-CM

## 2023-06-05 DIAGNOSIS — E11.9 TYPE 2 DIABETES MELLITUS WITHOUT COMPLICATION, WITHOUT LONG-TERM CURRENT USE OF INSULIN (MULTI): ICD-10-CM

## 2023-06-05 DIAGNOSIS — Z00.00 MEDICARE ANNUAL WELLNESS VISIT, SUBSEQUENT: Primary | ICD-10-CM

## 2023-06-05 DIAGNOSIS — I10 PRIMARY HYPERTENSION: ICD-10-CM

## 2023-06-05 PROBLEM — E78.5 HYPERLIPIDEMIA: Status: ACTIVE | Noted: 2023-06-05

## 2023-06-05 PROBLEM — H40.9 GLAUCOMA (INCREASED EYE PRESSURE): Status: ACTIVE | Noted: 2023-06-05

## 2023-06-05 PROBLEM — S91.209A TRAUMATIC AVULSION OF NAIL PLATE OF TOE: Status: ACTIVE | Noted: 2023-06-05

## 2023-06-05 PROBLEM — M79.674 PAIN OF RIGHT GREAT TOE: Status: ACTIVE | Noted: 2023-06-05

## 2023-06-05 PROCEDURE — 1157F ADVNC CARE PLAN IN RCRD: CPT | Performed by: FAMILY MEDICINE

## 2023-06-05 PROCEDURE — 4010F ACE/ARB THERAPY RXD/TAKEN: CPT | Performed by: FAMILY MEDICINE

## 2023-06-05 PROCEDURE — 3078F DIAST BP <80 MM HG: CPT | Performed by: FAMILY MEDICINE

## 2023-06-05 PROCEDURE — 1159F MED LIST DOCD IN RCRD: CPT | Performed by: FAMILY MEDICINE

## 2023-06-05 PROCEDURE — 1160F RVW MEDS BY RX/DR IN RCRD: CPT | Performed by: FAMILY MEDICINE

## 2023-06-05 PROCEDURE — 1170F FXNL STATUS ASSESSED: CPT | Performed by: FAMILY MEDICINE

## 2023-06-05 PROCEDURE — G0439 PPPS, SUBSEQ VISIT: HCPCS | Performed by: FAMILY MEDICINE

## 2023-06-05 PROCEDURE — 3077F SYST BP >= 140 MM HG: CPT | Performed by: FAMILY MEDICINE

## 2023-06-05 PROCEDURE — 90677 PCV20 VACCINE IM: CPT | Performed by: FAMILY MEDICINE

## 2023-06-05 PROCEDURE — G0009 ADMIN PNEUMOCOCCAL VACCINE: HCPCS | Performed by: FAMILY MEDICINE

## 2023-06-05 PROCEDURE — 1036F TOBACCO NON-USER: CPT | Performed by: FAMILY MEDICINE

## 2023-06-05 RX ORDER — GUAIFENESIN AND PHENYLEPHRINE HCL 400; 10 MG/1; MG/1
1 TABLET ORAL DAILY
COMMUNITY
Start: 2021-04-02

## 2023-06-05 RX ORDER — GARLIC 200 MG
2 TABLET ORAL DAILY
COMMUNITY
Start: 2021-04-02

## 2023-06-05 RX ORDER — MULTIVITAMIN
1 TABLET ORAL DAILY
COMMUNITY
Start: 2021-04-02

## 2023-06-05 RX ORDER — LATANOPROST 50 UG/ML
1 SOLUTION/ DROPS OPHTHALMIC
COMMUNITY
Start: 2021-06-17 | End: 2023-07-03

## 2023-06-05 RX ORDER — EPINEPHRINE 0.22MG
1 AEROSOL WITH ADAPTER (ML) INHALATION DAILY
COMMUNITY
Start: 2021-04-02

## 2023-06-05 RX ORDER — NAPROXEN SODIUM 220 MG/1
TABLET ORAL
COMMUNITY
Start: 2021-04-02

## 2023-06-05 RX ORDER — TIMOLOL MALEATE 5 MG/ML
SOLUTION/ DROPS OPHTHALMIC
COMMUNITY

## 2023-06-05 RX ORDER — ASPIRIN 81 MG/1
81 TABLET ORAL
COMMUNITY

## 2023-06-05 RX ORDER — BLOOD SUGAR DIAGNOSTIC
STRIP MISCELLANEOUS
COMMUNITY
Start: 2020-09-02 | End: 2023-07-24 | Stop reason: SDUPTHER

## 2023-06-05 ASSESSMENT — ACTIVITIES OF DAILY LIVING (ADL)
TAKING_MEDICATION: INDEPENDENT
DRESSING: INDEPENDENT
DOING_HOUSEWORK: INDEPENDENT
MANAGING_FINANCES: INDEPENDENT
BATHING: INDEPENDENT
GROCERY_SHOPPING: INDEPENDENT

## 2023-06-05 ASSESSMENT — PATIENT HEALTH QUESTIONNAIRE - PHQ9
1. LITTLE INTEREST OR PLEASURE IN DOING THINGS: NOT AT ALL
SUM OF ALL RESPONSES TO PHQ9 QUESTIONS 1 AND 2: 0
2. FEELING DOWN, DEPRESSED OR HOPELESS: NOT AT ALL

## 2023-06-05 ASSESSMENT — ENCOUNTER SYMPTOMS
OCCASIONAL FEELINGS OF UNSTEADINESS: 0
LOSS OF SENSATION IN FEET: 0
DEPRESSION: 0

## 2023-06-05 NOTE — TELEPHONE ENCOUNTER
Pt is calling because he just saw you this morning and you told him to get labs done this afternoon but when he got to the lab, there were no orders in his chart. Please order what labs he will need  Thanks    Pt's # [phone] Please call patient when orders placed

## 2023-06-05 NOTE — PROGRESS NOTES
"Subjective   Patient ID: Mario Mendenhall is a 73 y.o. male who presents for Medicare Annual Wellness Visit Subsequent and Diabetes.    HPI    AWV    DM  Does Not check glucose at home   Eats a generally healthy diet  Exercises  Currently taking Metformin  Last A1c on 2/11/22 @ 6.8%  Last eye exam 2 month ago  Does not see a Podiatrist    HTN and Hyperlipidemia follow up  Denies chest pain,SOB  Denies any swelling, headaches, lightheadedness or dizziness  Does  check BP at home  Currently taking Amlodipine,Lipitor, benazpril    No other concern    Review of systems  ; Patient seen today for exam denies any problems with headaches or vision, denies any shortness of breath chest pain nausea or vomiting, no black stool no blood in the stool no heartburn type symptoms denies any problems with constipation or diarrhea, and no dysuria-type symptoms    The patient's allergies medications were reviewed with them today    The patient's social family and surgical history or also reviewed here today, along with her past medical history.     Objective     Alert and active in  no acute distress  HEENT TMs clear oropharynx negative nares clear no drainage noted neck supple  With no adenopathy   Heart regular rate and rhythm without murmur and no carotid bruits  Lungs- clear to auscultation bilaterally, no wheeze or rhonchi noted  Thyroid -negative masses or nodularity  Abdomen- soft times four quadrants , bowel sounds positive no masses or organomegaly, negative tenderness guarding or rebound  Neurological exam unremarkable- DTRs in upper and lower extremities within normal limits.   skin -no lesions noted      /72   Pulse 85   Temp 36.4 °C (97.5 °F) (Temporal)   Resp 16   Ht 1.753 m (5' 9\")   Wt 99.8 kg (220 lb)   SpO2 98%   BMI 32.49 kg/m²     No Known Allergies    Assessment/Plan   Problem List Items Addressed This Visit          Circulatory    HTN (hypertension)    Relevant Orders    CBC and Auto Differential    " Comprehensive Metabolic Panel    Lipid Panel       Genitourinary    Elevated PSA    Relevant Orders    Prostate Specific Antigen, Screen       Endocrine/Metabolic    Diabetes (CMS/[facility])    Relevant Orders    Hemoglobin A1C    Albumin , Urine Random       Other    Hyperlipidemia     Other Visit Diagnoses       Medicare annual wellness visit, subsequent    -  Primary    Encounter for immunization        Relevant Orders    Pneumococcal conjugate vaccine, 20-valent, adult (PREVNAR 20) (Completed)          Discussed patient's BMI and to institute calorie reduction and increase exercise to decrease risk of diabetes and heart disease in the future.    Discussed Shingrix with him today.  He is aware this is a 2-shot series.  He can have this administered at any drug store.    Discussed Prevnar 20 with him.  He would like to hold off on this today.    Discussed Hytrin with him to help lower BP.    Also discussed medications to manage diabetes.  Can try Jardiance, or injections.    Prevnar 20 administered.    Labs have been ordered, she/he will have these performed and we will contact her/him with results.  (CBC, CMP, Lipid, PSA, A1C, Albumin)    If anything worsens or changes please call us at once, follow up in the office as planned.    Scribe Attestation  By signing my name below, I, Macrina Gregg MA, Scribe   attest that this documentation has been prepared under the direction and in the presence of Brian Crow DO.

## 2023-06-22 ENCOUNTER — LAB (OUTPATIENT)
Dept: LAB | Facility: LAB | Age: 73
End: 2023-06-22
Payer: MEDICARE

## 2023-06-22 DIAGNOSIS — I10 PRIMARY HYPERTENSION: ICD-10-CM

## 2023-06-22 DIAGNOSIS — E11.9 TYPE 2 DIABETES MELLITUS WITHOUT COMPLICATION, WITHOUT LONG-TERM CURRENT USE OF INSULIN (MULTI): ICD-10-CM

## 2023-06-22 DIAGNOSIS — R97.20 ELEVATED PSA: ICD-10-CM

## 2023-06-22 LAB
ALANINE AMINOTRANSFERASE (SGPT) (U/L) IN SER/PLAS: 19 U/L (ref 10–52)
ALBUMIN (G/DL) IN SER/PLAS: 4.7 G/DL (ref 3.4–5)
ALBUMIN (MG/L) IN URINE: 16.8 MG/L
ALBUMIN/CREATININE (UG/MG) IN URINE: 17.3 UG/MG CRT (ref 0–30)
ALKALINE PHOSPHATASE (U/L) IN SER/PLAS: 63 U/L (ref 33–136)
ANION GAP IN SER/PLAS: 14 MMOL/L (ref 10–20)
ASPARTATE AMINOTRANSFERASE (SGOT) (U/L) IN SER/PLAS: 20 U/L (ref 9–39)
BASOPHILS (10*3/UL) IN BLOOD BY AUTOMATED COUNT: 0.09 X10E9/L (ref 0–0.1)
BASOPHILS/100 LEUKOCYTES IN BLOOD BY AUTOMATED COUNT: 1.1 % (ref 0–2)
BILIRUBIN TOTAL (MG/DL) IN SER/PLAS: 0.8 MG/DL (ref 0–1.2)
CALCIUM (MG/DL) IN SER/PLAS: 9.5 MG/DL (ref 8.6–10.3)
CARBON DIOXIDE, TOTAL (MMOL/L) IN SER/PLAS: 25 MMOL/L (ref 21–32)
CHLORIDE (MMOL/L) IN SER/PLAS: 105 MMOL/L (ref 98–107)
CHOLESTEROL (MG/DL) IN SER/PLAS: 214 MG/DL (ref 0–199)
CHOLESTEROL IN HDL (MG/DL) IN SER/PLAS: 64.9 MG/DL
CHOLESTEROL/HDL RATIO: 3.3
CREATININE (MG/DL) IN SER/PLAS: 1.45 MG/DL (ref 0.5–1.3)
CREATININE (MG/DL) IN URINE: 97.3 MG/DL (ref 20–370)
EOSINOPHILS (10*3/UL) IN BLOOD BY AUTOMATED COUNT: 0.44 X10E9/L (ref 0–0.4)
EOSINOPHILS/100 LEUKOCYTES IN BLOOD BY AUTOMATED COUNT: 5.6 % (ref 0–6)
ERYTHROCYTE DISTRIBUTION WIDTH (RATIO) BY AUTOMATED COUNT: 12.9 % (ref 11.5–14.5)
ERYTHROCYTE MEAN CORPUSCULAR HEMOGLOBIN CONCENTRATION (G/DL) BY AUTOMATED: 34.9 G/DL (ref 32–36)
ERYTHROCYTE MEAN CORPUSCULAR VOLUME (FL) BY AUTOMATED COUNT: 90 FL (ref 80–100)
ERYTHROCYTES (10*6/UL) IN BLOOD BY AUTOMATED COUNT: 4.08 X10E12/L (ref 4.5–5.9)
ESTIMATED AVERAGE GLUCOSE FOR HBA1C: 148 MG/DL
GFR MALE: 51 ML/MIN/1.73M2
GLUCOSE (MG/DL) IN SER/PLAS: 144 MG/DL (ref 74–99)
HEMATOCRIT (%) IN BLOOD BY AUTOMATED COUNT: 36.7 % (ref 41–52)
HEMOGLOBIN (G/DL) IN BLOOD: 12.8 G/DL (ref 13.5–17.5)
HEMOGLOBIN A1C/HEMOGLOBIN TOTAL IN BLOOD: 6.8 %
IMMATURE GRANULOCYTES/100 LEUKOCYTES IN BLOOD BY AUTOMATED COUNT: 0.3 % (ref 0–0.9)
LDL: 118 MG/DL (ref 0–99)
LEUKOCYTES (10*3/UL) IN BLOOD BY AUTOMATED COUNT: 7.9 X10E9/L (ref 4.4–11.3)
LYMPHOCYTES (10*3/UL) IN BLOOD BY AUTOMATED COUNT: 1.73 X10E9/L (ref 0.8–3)
LYMPHOCYTES/100 LEUKOCYTES IN BLOOD BY AUTOMATED COUNT: 21.9 % (ref 13–44)
MONOCYTES (10*3/UL) IN BLOOD BY AUTOMATED COUNT: 0.86 X10E9/L (ref 0.05–0.8)
MONOCYTES/100 LEUKOCYTES IN BLOOD BY AUTOMATED COUNT: 10.9 % (ref 2–10)
NEUTROPHILS (10*3/UL) IN BLOOD BY AUTOMATED COUNT: 4.75 X10E9/L (ref 1.6–5.5)
NEUTROPHILS/100 LEUKOCYTES IN BLOOD BY AUTOMATED COUNT: 60.2 % (ref 40–80)
PLATELETS (10*3/UL) IN BLOOD AUTOMATED COUNT: 381 X10E9/L (ref 150–450)
POTASSIUM (MMOL/L) IN SER/PLAS: 5 MMOL/L (ref 3.5–5.3)
PROSTATE SPECIFIC ANTIGEN,SCREEN: 5.69 NG/ML (ref 0–4)
PROTEIN TOTAL: 7.2 G/DL (ref 6.4–8.2)
SODIUM (MMOL/L) IN SER/PLAS: 139 MMOL/L (ref 136–145)
TRIGLYCERIDE (MG/DL) IN SER/PLAS: 155 MG/DL (ref 0–149)
UREA NITROGEN (MG/DL) IN SER/PLAS: 23 MG/DL (ref 6–23)
VLDL: 31 MG/DL (ref 0–40)

## 2023-06-22 PROCEDURE — 82570 ASSAY OF URINE CREATININE: CPT

## 2023-06-22 PROCEDURE — 80061 LIPID PANEL: CPT

## 2023-06-22 PROCEDURE — 82043 UR ALBUMIN QUANTITATIVE: CPT

## 2023-06-22 PROCEDURE — 84153 ASSAY OF PSA TOTAL: CPT

## 2023-06-22 PROCEDURE — 83036 HEMOGLOBIN GLYCOSYLATED A1C: CPT

## 2023-06-22 PROCEDURE — 36415 COLL VENOUS BLD VENIPUNCTURE: CPT

## 2023-06-22 PROCEDURE — 85025 COMPLETE CBC W/AUTO DIFF WBC: CPT

## 2023-06-22 PROCEDURE — 80053 COMPREHEN METABOLIC PANEL: CPT

## 2023-06-23 ENCOUNTER — TELEPHONE (OUTPATIENT)
Dept: PRIMARY CARE | Facility: CLINIC | Age: 73
End: 2023-06-23
Payer: MEDICARE

## 2023-06-23 NOTE — TELEPHONE ENCOUNTER
----- Message from Brian Crow DO sent at 6/23/2023  8:00 AM EDT -----  His labs are showing his diabetes is stable,, but his kidney function is still off a little bit it is not spilling protein I want to see him back in 3 to 4 weeks to recheck the blood pressure we may need to decrease his lisinopril and add another medication,, it is important that we get his blood pressure down to almost perfect see him then all the rest the labs are within normal limits PSA is stable

## 2023-07-24 ENCOUNTER — OFFICE VISIT (OUTPATIENT)
Dept: PRIMARY CARE | Facility: CLINIC | Age: 73
End: 2023-07-24
Payer: MEDICARE

## 2023-07-24 VITALS
HEART RATE: 70 BPM | SYSTOLIC BLOOD PRESSURE: 136 MMHG | BODY MASS INDEX: 31.7 KG/M2 | HEIGHT: 69 IN | WEIGHT: 214 LBS | RESPIRATION RATE: 16 BRPM | OXYGEN SATURATION: 97 % | DIASTOLIC BLOOD PRESSURE: 74 MMHG

## 2023-07-24 DIAGNOSIS — E78.2 MIXED HYPERLIPIDEMIA: ICD-10-CM

## 2023-07-24 DIAGNOSIS — E11.3293 TYPE 2 DIABETES MELLITUS WITH BOTH EYES AFFECTED BY MILD NONPROLIFERATIVE RETINOPATHY WITHOUT MACULAR EDEMA, WITHOUT LONG-TERM CURRENT USE OF INSULIN (MULTI): ICD-10-CM

## 2023-07-24 DIAGNOSIS — I10 PRIMARY HYPERTENSION: Primary | ICD-10-CM

## 2023-07-24 PROBLEM — H25.13 AGE-RELATED NUCLEAR CATARACT OF BOTH EYES: Status: ACTIVE | Noted: 2023-04-04

## 2023-07-24 PROBLEM — H40.033 BORDERLINE GLAUCOMA WITH ANATOMICAL NARROW ANGLE OF BOTH EYES: Status: ACTIVE | Noted: 2023-04-04

## 2023-07-24 PROBLEM — H43.813 VITREOUS DEGENERATION OF BOTH EYES: Status: ACTIVE | Noted: 2023-04-04

## 2023-07-24 PROBLEM — H52.4 PRESBYOPIA: Status: ACTIVE | Noted: 2023-04-04

## 2023-07-24 PROBLEM — H52.203 ASTIGMATISM OF BOTH EYES: Status: ACTIVE | Noted: 2023-04-04

## 2023-07-24 PROBLEM — N40.0 BENIGN PROSTATIC HYPERPLASIA: Status: ACTIVE | Noted: 2023-07-24

## 2023-07-24 PROBLEM — H52.13 MYOPIA, BILATERAL: Status: ACTIVE | Noted: 2023-04-04

## 2023-07-24 PROBLEM — N52.9 ERECTILE DYSFUNCTION: Status: ACTIVE | Noted: 2023-07-24

## 2023-07-24 PROBLEM — H25.013 CORTICAL AGE-RELATED CATARACT OF BOTH EYES: Status: ACTIVE | Noted: 2023-04-04

## 2023-07-24 PROCEDURE — 99213 OFFICE O/P EST LOW 20 MIN: CPT | Performed by: FAMILY MEDICINE

## 2023-07-24 PROCEDURE — 3078F DIAST BP <80 MM HG: CPT | Performed by: FAMILY MEDICINE

## 2023-07-24 PROCEDURE — 1159F MED LIST DOCD IN RCRD: CPT | Performed by: FAMILY MEDICINE

## 2023-07-24 PROCEDURE — 1157F ADVNC CARE PLAN IN RCRD: CPT | Performed by: FAMILY MEDICINE

## 2023-07-24 PROCEDURE — 1036F TOBACCO NON-USER: CPT | Performed by: FAMILY MEDICINE

## 2023-07-24 PROCEDURE — 4010F ACE/ARB THERAPY RXD/TAKEN: CPT | Performed by: FAMILY MEDICINE

## 2023-07-24 PROCEDURE — 3044F HG A1C LEVEL LT 7.0%: CPT | Performed by: FAMILY MEDICINE

## 2023-07-24 PROCEDURE — 1160F RVW MEDS BY RX/DR IN RCRD: CPT | Performed by: FAMILY MEDICINE

## 2023-07-24 PROCEDURE — 3075F SYST BP GE 130 - 139MM HG: CPT | Performed by: FAMILY MEDICINE

## 2023-07-24 RX ORDER — BLOOD SUGAR DIAGNOSTIC
1 STRIP MISCELLANEOUS
Qty: 100 STRIP | Refills: 1 | Status: SHIPPED | OUTPATIENT
Start: 2023-07-24

## 2023-07-24 NOTE — PROGRESS NOTES
"Subjective   Patient ID: Mario Mendenhall is a 73 y.o. male who presents for Hypertension.  HPI  Patient is here for his Hypertension and has had elevated BP recently. He denies any heart related problems with the elevated BP.  He has a log of BP with him.    Blood sugar was 121 yesterday. He will need test strips refilled.  Last A1C was 6.8 on 6/22/23    Discussed Jardiance and Farxiga at length one of the medications I like him to be on to help with his kidney function also his blood pressure    He decided he did not want to take him at that time but is doing better with his exercise he is lost about 10 pounds which has been great        Review of systems  ; Patient seen today for exam denies any problems with headaches or vision, denies any shortness of breath chest pain nausea or vomiting, no black stool no blood in the stool no heartburn type symptoms denies any problems with constipation or diarrhea, and no dysuria-type symptoms    The patient's allergies medications were reviewed with them today    The patient's social family and surgical history or also reviewed here today, along with her past medical history.     Objective     Alert and active in  no acute distress  HEENT TMs clear oropharynx negative nares clear no drainage noted neck supple  With no adenopathy   Heart regular rate and rhythm without murmur and no carotid bruits  Lungs- clear to auscultation bilaterally, no wheeze or rhonchi noted  Thyroid -negative masses or nodularity  Abdomen- soft times four quadrants , bowel sounds positive no masses or organomegaly, negative tenderness guarding or rebound  Neurological exam unremarkable- DTRs in upper and lower extremities within normal limits.   skin -no lesions noted      /74 (BP Location: Right arm, Patient Position: Sitting, BP Cuff Size: Adult)   Pulse 70   Resp 16   Ht 1.753 m (5' 9\")   Wt 97.1 kg (214 lb)   SpO2 97%   BMI 31.60 kg/m²     No Known Allergies    Assessment/Plan "   Problem List Items Addressed This Visit       HTN (hypertension) - Primary    Hyperlipidemia    Type 2 diabetes mellitus with mild nonproliferative retinopathy of both eyes without macular edema (CMS/HCC)    Relevant Medications    blood sugar diagnostic (OneTouch Ultra Test) strip   We will contemplate the Jardiance and Farxiga with his insurance company see us back refer holidays at that time if things are not better with his sugars weight and blood pressure we will go ahead and do so at that time      If anything worsens or changes please call us at once, follow up in the office as planned,

## 2023-09-05 DIAGNOSIS — I10 PRIMARY HYPERTENSION: ICD-10-CM

## 2023-09-05 DIAGNOSIS — E11.8 CONTROLLED TYPE 2 DIABETES MELLITUS WITH COMPLICATION, WITHOUT LONG-TERM CURRENT USE OF INSULIN (MULTI): ICD-10-CM

## 2023-09-05 RX ORDER — BENAZEPRIL HYDROCHLORIDE 20 MG/1
TABLET ORAL
Qty: 180 TABLET | Refills: 0 | Status: SHIPPED | OUTPATIENT
Start: 2023-09-05 | End: 2023-12-20 | Stop reason: SDUPTHER

## 2023-09-05 RX ORDER — ATORVASTATIN CALCIUM 20 MG/1
20 TABLET, FILM COATED ORAL DAILY
Qty: 90 TABLET | Refills: 0 | Status: SHIPPED | OUTPATIENT
Start: 2023-09-05 | End: 2023-12-20 | Stop reason: SDUPTHER

## 2023-09-05 RX ORDER — AMLODIPINE BESYLATE 10 MG/1
10 TABLET ORAL DAILY
Qty: 90 TABLET | Refills: 0 | Status: SHIPPED | OUTPATIENT
Start: 2023-09-05 | End: 2023-12-20 | Stop reason: SDUPTHER

## 2023-09-05 RX ORDER — METFORMIN HYDROCHLORIDE 500 MG/1
500 TABLET, EXTENDED RELEASE ORAL
Qty: 180 TABLET | Refills: 0 | Status: SHIPPED | OUTPATIENT
Start: 2023-09-05 | End: 2023-12-20 | Stop reason: SDUPTHER

## 2023-12-19 NOTE — PROGRESS NOTES
Subjective   Patient ID: Mario Mendenhall is a 73 y.o. male who presents for Diabetes, Hypertension, and Hyperlipidemia.    HPI    HTN, HLD follow up   Denies chest pain,SOB  Denies any swelling, headaches, lightheadedness or dizziness  Does not check BP at home   Currently taking Amlodipine, Benazepril, Atorvastatin.   Eats generally healthy diet.  Staying active    Pt want to discuss other medication for his BP  Denies any coughing with Benazepril.  Denies welling with Amlodipine.    DM  Does check glucose at home   Today glucose was did not check it today but 2 days ago it was 146  Currently taking Metformin  Denies any hypoglycemic symptoms  Last eye exam 4 month  Does not see a Podiatrist  Last A1C was 6.8% on 6/22/23          No other concern / question  Review of systems  ; Patient seen today for exam denies any problems with headaches or vision, denies any shortness of breath chest pain nausea or vomiting, no black stool no blood in the stool no heartburn type symptoms denies any problems with constipation or diarrhea, and no dysuria-type symptoms    The patient's allergies medications were reviewed with them today    The patient's social family and surgical history or also reviewed here today, along with her past medical history.     Objective     Alert and active in  no acute distress  HEENT TMs clear oropharynx negative nares clear no drainage noted neck supple  With no adenopathy   Heart regular rate and rhythm without murmur and no carotid bruits  Lungs- clear to auscultation bilaterally, no wheeze or rhonchi noted  Thyroid -negative masses or nodularity  Abdomen- soft times four quadrants , bowel sounds positive no masses or organomegaly, negative tenderness guarding or rebound  Neurological exam unremarkable- DTRs in upper and lower extremities within normal limits.   skin -no lesions noted      /88 (BP Location: Right arm, Patient Position: Sitting, BP Cuff Size: Adult)   Pulse 102   Temp 36.6  "°C (97.9 °F) (Temporal)   Resp 16   Ht 1.753 m (5' 9\")   Wt 99.8 kg (220 lb)   SpO2 98%   BMI 32.49 kg/m²     No Known Allergies    Assessment/Plan   Problem List Items Addressed This Visit       Diabetes (CMS/[facility])    Relevant Medications    dapagliflozin propanediol (Farxiga) 5 mg    Other Relevant Orders    POCT glycosylated hemoglobin (Hb A1C) manually resulted (Completed)    HTN (hypertension)    Relevant Medications    amLODIPine (Norvasc) 10 mg tablet    benazepril (Lotensin) 20 mg tablet    Hyperlipidemia    Relevant Medications    atorvastatin (Lipitor) 20 mg tablet     Other Visit Diagnoses       Controlled type 2 diabetes mellitus with complication, without long-term current use of insulin (CMS/[facility])        Relevant Medications    metFORMIN  mg 24 hr tablet    BMI 32.0-32.9,adult        Class 1 obesity due to excess calories with serious comorbidity and body mass index (BMI) of 32.0 to 32.9 in adult              Discussed patient's BMI and to institute calorie reduction and increase exercise to decrease risk of diabetes and heart disease in the future.    Refill Amlodipine, Atorvastatin, Benazepril, Metformin.    Farxiga samples provided today.  5 mg then 10 mg let us know how he is doing with it I think will help his blood pressure kidneys and also ultimately lose some weight with that let us know things worsen or change    If anything worsens or changes please call us at once, follow up in the office as planned.    Scribe Attestation  By signing my name below, I, Macrina Gregg MA, Scribe   attest that this documentation has been prepared under the direction and in the presence of Brian Crow DO.      "

## 2023-12-20 ENCOUNTER — OFFICE VISIT (OUTPATIENT)
Dept: PRIMARY CARE | Facility: CLINIC | Age: 73
End: 2023-12-20
Payer: MEDICARE

## 2023-12-20 VITALS
HEIGHT: 69 IN | HEART RATE: 102 BPM | WEIGHT: 220 LBS | RESPIRATION RATE: 16 BRPM | DIASTOLIC BLOOD PRESSURE: 88 MMHG | OXYGEN SATURATION: 98 % | BODY MASS INDEX: 32.58 KG/M2 | TEMPERATURE: 97.9 F | SYSTOLIC BLOOD PRESSURE: 128 MMHG

## 2023-12-20 DIAGNOSIS — E66.09 CLASS 1 OBESITY DUE TO EXCESS CALORIES WITH SERIOUS COMORBIDITY AND BODY MASS INDEX (BMI) OF 32.0 TO 32.9 IN ADULT: ICD-10-CM

## 2023-12-20 DIAGNOSIS — E11.8 CONTROLLED TYPE 2 DIABETES MELLITUS WITH COMPLICATION, WITHOUT LONG-TERM CURRENT USE OF INSULIN (MULTI): ICD-10-CM

## 2023-12-20 DIAGNOSIS — E78.2 MIXED HYPERLIPIDEMIA: ICD-10-CM

## 2023-12-20 DIAGNOSIS — E11.3293 TYPE 2 DIABETES MELLITUS WITH BOTH EYES AFFECTED BY MILD NONPROLIFERATIVE RETINOPATHY WITHOUT MACULAR EDEMA, WITHOUT LONG-TERM CURRENT USE OF INSULIN (MULTI): ICD-10-CM

## 2023-12-20 DIAGNOSIS — I10 PRIMARY HYPERTENSION: ICD-10-CM

## 2023-12-20 LAB — POC HEMOGLOBIN A1C: 7.3 % (ref 4.2–6.5)

## 2023-12-20 PROCEDURE — 83036 HEMOGLOBIN GLYCOSYLATED A1C: CPT | Performed by: FAMILY MEDICINE

## 2023-12-20 PROCEDURE — 3044F HG A1C LEVEL LT 7.0%: CPT | Performed by: FAMILY MEDICINE

## 2023-12-20 PROCEDURE — 3008F BODY MASS INDEX DOCD: CPT | Performed by: FAMILY MEDICINE

## 2023-12-20 PROCEDURE — 3074F SYST BP LT 130 MM HG: CPT | Performed by: FAMILY MEDICINE

## 2023-12-20 PROCEDURE — 3079F DIAST BP 80-89 MM HG: CPT | Performed by: FAMILY MEDICINE

## 2023-12-20 PROCEDURE — 99214 OFFICE O/P EST MOD 30 MIN: CPT | Performed by: FAMILY MEDICINE

## 2023-12-20 PROCEDURE — 1160F RVW MEDS BY RX/DR IN RCRD: CPT | Performed by: FAMILY MEDICINE

## 2023-12-20 PROCEDURE — 1159F MED LIST DOCD IN RCRD: CPT | Performed by: FAMILY MEDICINE

## 2023-12-20 PROCEDURE — 1036F TOBACCO NON-USER: CPT | Performed by: FAMILY MEDICINE

## 2023-12-20 PROCEDURE — 4010F ACE/ARB THERAPY RXD/TAKEN: CPT | Performed by: FAMILY MEDICINE

## 2023-12-20 RX ORDER — ATORVASTATIN CALCIUM 20 MG/1
20 TABLET, FILM COATED ORAL DAILY
Qty: 90 TABLET | Refills: 1 | Status: SHIPPED | OUTPATIENT
Start: 2023-12-20

## 2023-12-20 RX ORDER — DAPAGLIFLOZIN 5 MG/1
5 TABLET, FILM COATED ORAL DAILY
Qty: 30 TABLET | Refills: 2 | COMMUNITY
Start: 2023-12-20 | End: 2024-01-29 | Stop reason: SDUPTHER

## 2023-12-20 RX ORDER — BENAZEPRIL HYDROCHLORIDE 20 MG/1
TABLET ORAL
Qty: 180 TABLET | Refills: 1 | Status: SHIPPED | OUTPATIENT
Start: 2023-12-20

## 2023-12-20 RX ORDER — METFORMIN HYDROCHLORIDE 500 MG/1
500 TABLET, EXTENDED RELEASE ORAL
Qty: 180 TABLET | Refills: 1 | Status: SHIPPED | OUTPATIENT
Start: 2023-12-20

## 2023-12-20 RX ORDER — AMLODIPINE BESYLATE 10 MG/1
10 TABLET ORAL DAILY
Qty: 90 TABLET | Refills: 1 | Status: SHIPPED | OUTPATIENT
Start: 2023-12-20

## 2024-01-29 ENCOUNTER — TELEPHONE (OUTPATIENT)
Dept: PRIMARY CARE | Facility: CLINIC | Age: 74
End: 2024-01-29
Payer: MEDICARE

## 2024-01-29 DIAGNOSIS — E11.3293 TYPE 2 DIABETES MELLITUS WITH BOTH EYES AFFECTED BY MILD NONPROLIFERATIVE RETINOPATHY WITHOUT MACULAR EDEMA, WITHOUT LONG-TERM CURRENT USE OF INSULIN (MULTI): ICD-10-CM

## 2024-01-29 RX ORDER — DAPAGLIFLOZIN 5 MG/1
5 TABLET, FILM COATED ORAL DAILY
Qty: 30 TABLET | Refills: 2 | Status: SHIPPED | OUTPATIENT
Start: 2024-01-29 | End: 2024-04-24 | Stop reason: SDUPTHER

## 2024-01-29 NOTE — TELEPHONE ENCOUNTER
Rx Refill Request Telephone Encounter    Name:  Mario Mendenhall  : 1950     Medication Name:  FARXIGA  Dose (Optional):    5 MG  Quantity (Optional):      Directions (Optional):   1 TABLET DAILY    ALLERGIES:   ON FILE    Specific Pharmacy location:  [facility]    Date of last appointment:  23  Date of next appointment:  NONE    Best number to reach patient:  [phone]

## 2024-01-29 NOTE — TELEPHONE ENCOUNTER
SPOKE WITH PATIENT - HE WOULD RATHER SEE DERMATOLOGY - GAVE NUMBER TO [facility] - HE WILL CALL AND SCHEDULE APPT.

## 2024-01-29 NOTE — TELEPHONE ENCOUNTER
PATIENT IS CALLING - REPORTS THAT HE WOULD LIKE A REFERRAL TO GET A MOLE REMOVED - WONDERING IF HE SHOULD SEE YOU FIRST - OR CAN YOU REFER OUT TO SOMEONE?  PLEASE ADVISE.    [phone]

## 2024-04-23 NOTE — PROGRESS NOTES
Subjective   Patient ID: Mario Mendenhall is a 74 y.o. male who presents for Diabetes and Hypertension.    HPI    DM  Does check glucose at home   Did not check today.  Currently taking Metformin  Denies any hypoglycemic symptoms  Last eye exam yesterday.  Does not see a Podiatrist  Last A1C was 23@ 7.3%    HTN follow up  Denies chest pain, SOB, swelling, headaches, lightheadedness or dizziness.   Eats a generally healthy diet, Exercises.   Does occasionally check BP at home.   Currently taking Amlodipine, Benazepril.    Denies any chest pain or shortness of breath as mom is 94 dad  of aneurysm not sure if it was brain or abdominal his CT cardiac was okay    Siblings have no cardiac issues as far as he knows he does feel extra beats at night when he lays down otherwise unremarkable    Review of systems  ; Patient seen today for exam denies any problems with headaches or vision, denies any shortness of breath chest pain nausea or vomiting, no black stool no blood in the stool no heartburn type symptoms denies any problems with constipation or diarrhea, and no dysuria-type symptoms    The patient's allergies medications were reviewed with them today    The patient's social family and surgical history or also reviewed here today, along with her past medical history.     Objective     Alert and active in  no acute distress  HEENT TMs clear oropharynx negative nares clear no drainage noted neck supple  With no adenopathy   Heart regular rate and rhythm without murmur and no carotid bruits  Lungs- clear to auscultation bilaterally, no wheeze or rhonchi noted  Thyroid -negative masses or nodularity  Abdomen- soft times four quadrants , bowel sounds positive no masses or organomegaly, negative tenderness guarding or rebound  Neurological exam unremarkable- DTRs in upper and lower extremities within normal limits.   skin -no lesions noted      /74 (BP Location: Right arm, Patient Position: Sitting, BP Cuff  "Size: Large adult)   Pulse 87   Temp 36.8 °C (98.2 °F) (Temporal)   Resp 16   Ht 1.753 m (5' 9\")   Wt 96 kg (211 lb 9.6 oz)   SpO2 97%   BMI 31.25 kg/m²     No Known Allergies    Assessment/Plan   Problem List Items Addressed This Visit       Diabetes (Multi)    Relevant Medications    dapagliflozin propanediol (Farxiga) 5 mg    Other Relevant Orders    POCT glycosylated hemoglobin (Hb A1C) manually resulted (Completed)    Elevated PSA    Hyperlipidemia    Benign prostatic hyperplasia    Type 2 diabetes mellitus with mild nonproliferative retinopathy of both eyes without macular edema (Multi)    Relevant Medications    dapagliflozin propanediol (Farxiga) 5 mg    Other Relevant Orders    POCT glycosylated hemoglobin (Hb A1C) manually resulted (Completed)    Essential hypertension    Relevant Medications    metoprolol succinate XL (Toprol-XL) 25 mg 24 hr tablet    Other Relevant Orders    Referral to Cardiology     Other Visit Diagnoses       PVC (premature ventricular contraction)    -  Primary    Relevant Medications    metoprolol succinate XL (Toprol-XL) 25 mg 24 hr tablet    Other Relevant Orders    ECG 12 lead (Clinic Performed) (Completed)    Referral to Cardiology    BMI 31.0-31.9,adult        Class 1 obesity due to excess calories with serious comorbidity and body mass index (BMI) of 31.0 to 31.9 in adult            With his extra beats and his multiple risk factors when him see cardiology I think he needs further evaluation such as echo and stress testing we will put him on low-dose metoprolol at this time the Farxiga has been very good for his sugars also with her weight loss we will definitely increase that eventually      Any shortness of breath or chest pain go to the hospital wants      If anything worsens or changes please call us at once, follow up in the office as planned,     "

## 2024-04-24 ENCOUNTER — OFFICE VISIT (OUTPATIENT)
Dept: PRIMARY CARE | Facility: CLINIC | Age: 74
End: 2024-04-24
Payer: MEDICARE

## 2024-04-24 VITALS
RESPIRATION RATE: 16 BRPM | HEART RATE: 87 BPM | DIASTOLIC BLOOD PRESSURE: 74 MMHG | HEIGHT: 69 IN | SYSTOLIC BLOOD PRESSURE: 162 MMHG | WEIGHT: 211.6 LBS | OXYGEN SATURATION: 97 % | BODY MASS INDEX: 31.34 KG/M2 | TEMPERATURE: 98.2 F

## 2024-04-24 DIAGNOSIS — I49.3 PVC (PREMATURE VENTRICULAR CONTRACTION): Primary | ICD-10-CM

## 2024-04-24 DIAGNOSIS — N40.0 BENIGN PROSTATIC HYPERPLASIA, UNSPECIFIED WHETHER LOWER URINARY TRACT SYMPTOMS PRESENT: ICD-10-CM

## 2024-04-24 DIAGNOSIS — E11.3293 TYPE 2 DIABETES MELLITUS WITH BOTH EYES AFFECTED BY MILD NONPROLIFERATIVE RETINOPATHY WITHOUT MACULAR EDEMA, WITHOUT LONG-TERM CURRENT USE OF INSULIN (MULTI): ICD-10-CM

## 2024-04-24 DIAGNOSIS — E78.2 MIXED HYPERLIPIDEMIA: ICD-10-CM

## 2024-04-24 DIAGNOSIS — E66.09 CLASS 1 OBESITY DUE TO EXCESS CALORIES WITH SERIOUS COMORBIDITY AND BODY MASS INDEX (BMI) OF 31.0 TO 31.9 IN ADULT: ICD-10-CM

## 2024-04-24 DIAGNOSIS — R97.20 ELEVATED PSA: ICD-10-CM

## 2024-04-24 DIAGNOSIS — I10 ESSENTIAL HYPERTENSION: ICD-10-CM

## 2024-04-24 DIAGNOSIS — E11.3293 TYPE 2 DIABETES MELLITUS WITH MILD NONPROLIFERATIVE RETINOPATHY OF BOTH EYES WITHOUT MACULAR EDEMA, UNSPECIFIED WHETHER LONG TERM INSULIN USE (MULTI): ICD-10-CM

## 2024-04-24 LAB — POC HEMOGLOBIN A1C: 6.5 % (ref 4.2–6.5)

## 2024-04-24 PROCEDURE — 3077F SYST BP >= 140 MM HG: CPT | Performed by: FAMILY MEDICINE

## 2024-04-24 PROCEDURE — 1036F TOBACCO NON-USER: CPT | Performed by: FAMILY MEDICINE

## 2024-04-24 PROCEDURE — 99214 OFFICE O/P EST MOD 30 MIN: CPT | Performed by: FAMILY MEDICINE

## 2024-04-24 PROCEDURE — 83036 HEMOGLOBIN GLYCOSYLATED A1C: CPT | Performed by: FAMILY MEDICINE

## 2024-04-24 PROCEDURE — 3078F DIAST BP <80 MM HG: CPT | Performed by: FAMILY MEDICINE

## 2024-04-24 PROCEDURE — 1157F ADVNC CARE PLAN IN RCRD: CPT | Performed by: FAMILY MEDICINE

## 2024-04-24 PROCEDURE — 4010F ACE/ARB THERAPY RXD/TAKEN: CPT | Performed by: FAMILY MEDICINE

## 2024-04-24 PROCEDURE — 93000 ELECTROCARDIOGRAM COMPLETE: CPT | Performed by: FAMILY MEDICINE

## 2024-04-24 PROCEDURE — 1160F RVW MEDS BY RX/DR IN RCRD: CPT | Performed by: FAMILY MEDICINE

## 2024-04-24 PROCEDURE — 3008F BODY MASS INDEX DOCD: CPT | Performed by: FAMILY MEDICINE

## 2024-04-24 PROCEDURE — 1159F MED LIST DOCD IN RCRD: CPT | Performed by: FAMILY MEDICINE

## 2024-04-24 RX ORDER — METOPROLOL SUCCINATE 25 MG/1
25 TABLET, EXTENDED RELEASE ORAL DAILY
Qty: 30 TABLET | Refills: 1 | Status: SHIPPED | OUTPATIENT
Start: 2024-04-24 | End: 2025-04-24

## 2024-04-24 RX ORDER — DAPAGLIFLOZIN 5 MG/1
5 TABLET, FILM COATED ORAL DAILY
Qty: 30 TABLET | Refills: 5 | Status: SHIPPED | OUTPATIENT
Start: 2024-04-24 | End: 2025-04-24

## 2024-06-14 DIAGNOSIS — E78.2 MIXED HYPERLIPIDEMIA: ICD-10-CM

## 2024-06-14 DIAGNOSIS — E11.8 CONTROLLED TYPE 2 DIABETES MELLITUS WITH COMPLICATION, WITHOUT LONG-TERM CURRENT USE OF INSULIN (MULTI): ICD-10-CM

## 2024-06-14 DIAGNOSIS — I10 PRIMARY HYPERTENSION: ICD-10-CM

## 2024-06-14 RX ORDER — ATORVASTATIN CALCIUM 20 MG/1
20 TABLET, FILM COATED ORAL DAILY
Qty: 90 TABLET | Refills: 0 | Status: SHIPPED | OUTPATIENT
Start: 2024-06-14

## 2024-06-14 RX ORDER — BENAZEPRIL HYDROCHLORIDE 20 MG/1
TABLET ORAL
Qty: 180 TABLET | Refills: 0 | Status: SHIPPED | OUTPATIENT
Start: 2024-06-14

## 2024-06-14 RX ORDER — METFORMIN HYDROCHLORIDE 500 MG/1
TABLET, EXTENDED RELEASE ORAL
Qty: 180 TABLET | Refills: 0 | Status: SHIPPED | OUTPATIENT
Start: 2024-06-14

## 2024-06-14 RX ORDER — AMLODIPINE BESYLATE 10 MG/1
10 TABLET ORAL DAILY
Qty: 90 TABLET | Refills: 0 | Status: SHIPPED | OUTPATIENT
Start: 2024-06-14

## 2024-06-20 NOTE — PROGRESS NOTES
"Subjective   Patient ID: Mario Mendenhall \"Ed\" is a 74 y.o. male who presents for Medicare Annual Wellness Visit Subsequent, Diabetes, and Hypertension.    HPI    Patient presents today for a Medicare AWV, and follow-up on Diabetes, Hypertension, and Hyperlipidemia. Yes follow a low sugar, low sodium, and low fat diet. Yes check sugar and BP at home. Staying active. Pt did not fast for BW. BP at home at home is still elevated.     Upcomming appointment with Dr. Thompson on 7/10/24.     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     Blood pressure at home has been mostly good occasionally is elevated    Please had no more extra beats since the low-dose metoprolol but still needs evaluation    A1C on 4-24-24 was 6.5%.    Has no other new problem /question.    Advanced Care Planning  Mario Mendenhall \"Ed\" and I discussed their advance care plan preferences such as living will, and durable health care power of , which include: yes Attempt Resuscitation, no Intubate & Ventilate, no Comfort Care or Life- Sustaning Care. I reminded patient to talk with their health care agent, wife, about their health care goals. Note reflects patient's free will and care goals as expressed to me.       Review of systems  ; Patient seen today for exam denies any problems with headaches or vision, denies any shortness of breath chest pain nausea or vomiting, no black stool no blood in the stool no heartburn type symptoms denies any problems with constipation or diarrhea, and no dysuria-type symptoms    The patient's allergies medications were reviewed with them today    The patient's social family and surgical history or also reviewed here today, along with her past medical history.     Objective     Alert and active in  no acute distress  HEENT TMs clear oropharynx negative nares clear no drainage noted neck supple  With no adenopathy   Heart regular rate and rhythm without murmur and no carotid " "bruits  Lungs- clear to auscultation bilaterally, no wheeze or rhonchi noted  Thyroid -negative masses or nodularity  Abdomen- soft times four quadrants , bowel sounds positive no masses or organomegaly, negative tenderness guarding or rebound  Neurological exam unremarkable- DTRs in upper and lower extremities within normal limits.   skin -no lesions noted      /70   Pulse 71   Temp 36.2 °C (97.1 °F) (Temporal)   Resp 16   Ht 1.753 m (5' 9\")   Wt 94.6 kg (208 lb 9.6 oz)   SpO2 98%   BMI 30.80 kg/m²     No Known Allergies    Assessment/Plan   Problem List Items Addressed This Visit       Diabetes (Multi)    Relevant Orders    Comprehensive metabolic panel    Lipid panel    Albumin, urine, random    HTN (hypertension)    Relevant Medications    benazepril (Lotensin) 20 mg tablet    amLODIPine (Norvasc) 10 mg tablet    Other Relevant Orders    Comprehensive metabolic panel    Lipid panel    CBC and Auto Differential    Hyperlipidemia    Relevant Medications    atorvastatin (Lipitor) 20 mg tablet    Other Relevant Orders    Comprehensive metabolic panel    Lipid panel    Benign prostatic hyperplasia    Relevant Orders    PSA    Essential hypertension    Relevant Medications    metoprolol succinate XL (Toprol-XL) 25 mg 24 hr tablet    BMI 30.0-30.9,adult    Class 1 obesity due to excess calories without serious comorbidity with body mass index (BMI) of 30.0 to 30.9 in adult    Medicare annual wellness visit, subsequent - Primary     Other Visit Diagnoses       PVC (premature ventricular contraction)        Relevant Medications    metoprolol succinate XL (Toprol-XL) 25 mg 24 hr tablet    amLODIPine (Norvasc) 10 mg tablet    Controlled type 2 diabetes mellitus with complication, without long-term current use of insulin (Multi)        Relevant Medications    metFORMIN  mg 24 hr tablet          Medicare wellness questionnaire reviewed in detail.   No problems with activities of daily living. Home safety " issues reviewed.   Advanced care planning discussed with patient.  Reminded to have an updated will if needed.    CBC, CMP, LIPID, PSA, URINE ALBUMIN ordered today  Amlodipine, atorvastatin, benazepril, metformin and metoprolol refilled today.     Continue to monitor BP at home 2 times daily until appointment with Dr. Thompson. Take readings to appointment.  If his BP readings are still elevated he may need a low-dose diuretic he was on 1 in the past made him urinate a lot but may need to try it again with his resistant hypertension,, we also discussed need for possibly a stress echo visualization of his arteries with his multiple risk factors    So far so good no more palpitations to the ER if things worsen at all change in any way      If anything worsens or changes please call us at once, follow up in the office as planned in 6 months or sooner as needed,   Scribe Attestation  By signing my name below, I, Susan Lang MA , Scribe   attest that this documentation has been prepared under the direction and in the presence of Brian Crow DO.

## 2024-06-24 ENCOUNTER — APPOINTMENT (OUTPATIENT)
Dept: PRIMARY CARE | Facility: CLINIC | Age: 74
End: 2024-06-24
Payer: MEDICARE

## 2024-06-24 VITALS
SYSTOLIC BLOOD PRESSURE: 148 MMHG | WEIGHT: 208.6 LBS | RESPIRATION RATE: 16 BRPM | HEART RATE: 71 BPM | OXYGEN SATURATION: 98 % | DIASTOLIC BLOOD PRESSURE: 70 MMHG | BODY MASS INDEX: 30.9 KG/M2 | TEMPERATURE: 97.1 F | HEIGHT: 69 IN

## 2024-06-24 DIAGNOSIS — Z00.00 MEDICARE ANNUAL WELLNESS VISIT, SUBSEQUENT: Primary | ICD-10-CM

## 2024-06-24 DIAGNOSIS — I49.3 PVC (PREMATURE VENTRICULAR CONTRACTION): ICD-10-CM

## 2024-06-24 DIAGNOSIS — E78.2 MIXED HYPERLIPIDEMIA: ICD-10-CM

## 2024-06-24 DIAGNOSIS — I10 ESSENTIAL HYPERTENSION: ICD-10-CM

## 2024-06-24 DIAGNOSIS — E11.9 TYPE 2 DIABETES MELLITUS WITHOUT COMPLICATION, WITHOUT LONG-TERM CURRENT USE OF INSULIN (MULTI): ICD-10-CM

## 2024-06-24 DIAGNOSIS — N40.0 BENIGN PROSTATIC HYPERPLASIA, UNSPECIFIED WHETHER LOWER URINARY TRACT SYMPTOMS PRESENT: ICD-10-CM

## 2024-06-24 DIAGNOSIS — E11.8 CONTROLLED TYPE 2 DIABETES MELLITUS WITH COMPLICATION, WITHOUT LONG-TERM CURRENT USE OF INSULIN (MULTI): ICD-10-CM

## 2024-06-24 DIAGNOSIS — I10 PRIMARY HYPERTENSION: ICD-10-CM

## 2024-06-24 DIAGNOSIS — E66.09 CLASS 1 OBESITY DUE TO EXCESS CALORIES WITHOUT SERIOUS COMORBIDITY WITH BODY MASS INDEX (BMI) OF 30.0 TO 30.9 IN ADULT: ICD-10-CM

## 2024-06-24 PROBLEM — E66.811 CLASS 1 OBESITY DUE TO EXCESS CALORIES WITHOUT SERIOUS COMORBIDITY WITH BODY MASS INDEX (BMI) OF 30.0 TO 30.9 IN ADULT: Status: ACTIVE | Noted: 2024-06-24

## 2024-06-24 PROCEDURE — 3078F DIAST BP <80 MM HG: CPT | Performed by: FAMILY MEDICINE

## 2024-06-24 PROCEDURE — 1170F FXNL STATUS ASSESSED: CPT | Performed by: FAMILY MEDICINE

## 2024-06-24 PROCEDURE — 99213 OFFICE O/P EST LOW 20 MIN: CPT | Performed by: FAMILY MEDICINE

## 2024-06-24 PROCEDURE — 3077F SYST BP >= 140 MM HG: CPT | Performed by: FAMILY MEDICINE

## 2024-06-24 PROCEDURE — 3008F BODY MASS INDEX DOCD: CPT | Performed by: FAMILY MEDICINE

## 2024-06-24 PROCEDURE — G0439 PPPS, SUBSEQ VISIT: HCPCS | Performed by: FAMILY MEDICINE

## 2024-06-24 PROCEDURE — 1159F MED LIST DOCD IN RCRD: CPT | Performed by: FAMILY MEDICINE

## 2024-06-24 PROCEDURE — 1123F ACP DISCUSS/DSCN MKR DOCD: CPT | Performed by: FAMILY MEDICINE

## 2024-06-24 PROCEDURE — 1036F TOBACCO NON-USER: CPT | Performed by: FAMILY MEDICINE

## 2024-06-24 PROCEDURE — 4010F ACE/ARB THERAPY RXD/TAKEN: CPT | Performed by: FAMILY MEDICINE

## 2024-06-24 PROCEDURE — 1157F ADVNC CARE PLAN IN RCRD: CPT | Performed by: FAMILY MEDICINE

## 2024-06-24 PROCEDURE — 1158F ADVNC CARE PLAN TLK DOCD: CPT | Performed by: FAMILY MEDICINE

## 2024-06-24 RX ORDER — METOPROLOL SUCCINATE 25 MG/1
25 TABLET, EXTENDED RELEASE ORAL DAILY
Qty: 90 TABLET | Refills: 1 | Status: SHIPPED | OUTPATIENT
Start: 2024-06-24 | End: 2024-12-21

## 2024-06-24 RX ORDER — AMLODIPINE BESYLATE 10 MG/1
10 TABLET ORAL DAILY
Qty: 90 TABLET | Refills: 1 | Status: SHIPPED | OUTPATIENT
Start: 2024-06-24

## 2024-06-24 RX ORDER — METFORMIN HYDROCHLORIDE 500 MG/1
500 TABLET, EXTENDED RELEASE ORAL
Qty: 180 TABLET | Refills: 1 | Status: SHIPPED | OUTPATIENT
Start: 2024-06-24

## 2024-06-24 RX ORDER — ATORVASTATIN CALCIUM 20 MG/1
20 TABLET, FILM COATED ORAL DAILY
Qty: 90 TABLET | Refills: 1 | Status: SHIPPED | OUTPATIENT
Start: 2024-06-24

## 2024-06-24 RX ORDER — BENAZEPRIL HYDROCHLORIDE 20 MG/1
TABLET ORAL
Qty: 180 TABLET | Refills: 1 | Status: SHIPPED | OUTPATIENT
Start: 2024-06-24

## 2024-06-24 ASSESSMENT — ACTIVITIES OF DAILY LIVING (ADL)
TAKING_MEDICATION: INDEPENDENT
MANAGING_FINANCES: INDEPENDENT
DRESSING: INDEPENDENT
BATHING: INDEPENDENT
GROCERY_SHOPPING: INDEPENDENT
DOING_HOUSEWORK: INDEPENDENT

## 2024-06-24 ASSESSMENT — PATIENT HEALTH QUESTIONNAIRE - PHQ9
SUM OF ALL RESPONSES TO PHQ9 QUESTIONS 1 AND 2: 0
2. FEELING DOWN, DEPRESSED OR HOPELESS: NOT AT ALL
1. LITTLE INTEREST OR PLEASURE IN DOING THINGS: NOT AT ALL

## 2024-06-28 ENCOUNTER — LAB (OUTPATIENT)
Dept: LAB | Facility: LAB | Age: 74
End: 2024-06-28
Payer: MEDICARE

## 2024-06-28 DIAGNOSIS — N40.0 BENIGN PROSTATIC HYPERPLASIA, UNSPECIFIED WHETHER LOWER URINARY TRACT SYMPTOMS PRESENT: ICD-10-CM

## 2024-06-28 DIAGNOSIS — E11.9 TYPE 2 DIABETES MELLITUS WITHOUT COMPLICATION, WITHOUT LONG-TERM CURRENT USE OF INSULIN (MULTI): ICD-10-CM

## 2024-06-28 DIAGNOSIS — I10 PRIMARY HYPERTENSION: ICD-10-CM

## 2024-06-28 DIAGNOSIS — E78.2 MIXED HYPERLIPIDEMIA: ICD-10-CM

## 2024-06-28 LAB
ALBUMIN SERPL BCP-MCNC: 4.5 G/DL (ref 3.4–5)
ALP SERPL-CCNC: 58 U/L (ref 33–136)
ALT SERPL W P-5'-P-CCNC: 19 U/L (ref 10–52)
ANION GAP SERPL CALC-SCNC: 13 MMOL/L (ref 10–20)
AST SERPL W P-5'-P-CCNC: 19 U/L (ref 9–39)
BASOPHILS # BLD AUTO: 0.06 X10*3/UL (ref 0–0.1)
BASOPHILS NFR BLD AUTO: 0.9 %
BILIRUB SERPL-MCNC: 0.9 MG/DL (ref 0–1.2)
BUN SERPL-MCNC: 28 MG/DL (ref 6–23)
CALCIUM SERPL-MCNC: 9.4 MG/DL (ref 8.6–10.3)
CHLORIDE SERPL-SCNC: 105 MMOL/L (ref 98–107)
CHOLEST SERPL-MCNC: 194 MG/DL (ref 0–199)
CHOLESTEROL/HDL RATIO: 2.8
CO2 SERPL-SCNC: 26 MMOL/L (ref 21–32)
CREAT SERPL-MCNC: 1.54 MG/DL (ref 0.5–1.3)
CREAT UR-MCNC: 91.2 MG/DL (ref 20–370)
EGFRCR SERPLBLD CKD-EPI 2021: 47 ML/MIN/1.73M*2
EOSINOPHIL # BLD AUTO: 0.39 X10*3/UL (ref 0–0.4)
EOSINOPHIL NFR BLD AUTO: 5.9 %
ERYTHROCYTE [DISTWIDTH] IN BLOOD BY AUTOMATED COUNT: 13.2 % (ref 11.5–14.5)
GLUCOSE SERPL-MCNC: 108 MG/DL (ref 74–99)
HCT VFR BLD AUTO: 41.3 % (ref 41–52)
HDLC SERPL-MCNC: 68.7 MG/DL
HGB BLD-MCNC: 13.8 G/DL (ref 13.5–17.5)
IMM GRANULOCYTES # BLD AUTO: 0.01 X10*3/UL (ref 0–0.5)
IMM GRANULOCYTES NFR BLD AUTO: 0.2 % (ref 0–0.9)
LDLC SERPL CALC-MCNC: 101 MG/DL
LYMPHOCYTES # BLD AUTO: 1.32 X10*3/UL (ref 0.8–3)
LYMPHOCYTES NFR BLD AUTO: 20 %
MCH RBC QN AUTO: 31.4 PG (ref 26–34)
MCHC RBC AUTO-ENTMCNC: 33.4 G/DL (ref 32–36)
MCV RBC AUTO: 94 FL (ref 80–100)
MICROALBUMIN UR-MCNC: 16.5 MG/L
MICROALBUMIN/CREAT UR: 18.1 UG/MG CREAT
MONOCYTES # BLD AUTO: 0.79 X10*3/UL (ref 0.05–0.8)
MONOCYTES NFR BLD AUTO: 12 %
NEUTROPHILS # BLD AUTO: 4.02 X10*3/UL (ref 1.6–5.5)
NEUTROPHILS NFR BLD AUTO: 61 %
NON HDL CHOLESTEROL: 125 MG/DL (ref 0–149)
NRBC BLD-RTO: 0 /100 WBCS (ref 0–0)
PLATELET # BLD AUTO: 370 X10*3/UL (ref 150–450)
POTASSIUM SERPL-SCNC: 4.8 MMOL/L (ref 3.5–5.3)
PROT SERPL-MCNC: 7.1 G/DL (ref 6.4–8.2)
PSA SERPL-MCNC: 4.63 NG/ML
RBC # BLD AUTO: 4.4 X10*6/UL (ref 4.5–5.9)
SODIUM SERPL-SCNC: 139 MMOL/L (ref 136–145)
TRIGL SERPL-MCNC: 123 MG/DL (ref 0–149)
VLDL: 25 MG/DL (ref 0–40)
WBC # BLD AUTO: 6.6 X10*3/UL (ref 4.4–11.3)

## 2024-06-28 PROCEDURE — 80061 LIPID PANEL: CPT

## 2024-06-28 PROCEDURE — 36415 COLL VENOUS BLD VENIPUNCTURE: CPT

## 2024-06-28 PROCEDURE — 82570 ASSAY OF URINE CREATININE: CPT

## 2024-06-28 PROCEDURE — 84153 ASSAY OF PSA TOTAL: CPT

## 2024-06-28 PROCEDURE — 80053 COMPREHEN METABOLIC PANEL: CPT

## 2024-06-28 PROCEDURE — 82043 UR ALBUMIN QUANTITATIVE: CPT

## 2024-06-28 PROCEDURE — 85025 COMPLETE CBC W/AUTO DIFF WBC: CPT

## 2024-07-01 ENCOUNTER — TELEPHONE (OUTPATIENT)
Dept: PRIMARY CARE | Facility: CLINIC | Age: 74
End: 2024-07-01
Payer: MEDICARE

## 2024-07-01 DIAGNOSIS — N28.9 ABNORMAL RENAL FUNCTION: ICD-10-CM

## 2024-07-01 NOTE — TELEPHONE ENCOUNTER
----- Message from Brian Crow DO sent at 7/1/2024  3:07 PM EDT -----  Reviewed his labs his cholesterol is better but not where he wanted to be,, lets have him take 40 mg of atorvastatin alternating with 20 mg, for example Monday 48 Tuesday 20 and alternate,, also his kidney function is about the same I want to get an ultrasound of his kidney,, PSA is stable also

## 2024-07-09 ENCOUNTER — HOSPITAL ENCOUNTER (OUTPATIENT)
Dept: RADIOLOGY | Facility: CLINIC | Age: 74
Discharge: HOME | End: 2024-07-09
Payer: MEDICARE

## 2024-07-09 DIAGNOSIS — N28.9 ABNORMAL RENAL FUNCTION: ICD-10-CM

## 2024-07-09 PROCEDURE — 76770 US EXAM ABDO BACK WALL COMP: CPT | Performed by: RADIOLOGY

## 2024-07-09 PROCEDURE — 76770 US EXAM ABDO BACK WALL COMP: CPT

## 2024-07-10 ENCOUNTER — APPOINTMENT (OUTPATIENT)
Dept: CARDIOLOGY | Facility: CLINIC | Age: 74
End: 2024-07-10
Payer: MEDICARE

## 2024-07-10 VITALS
BODY MASS INDEX: 30.96 KG/M2 | DIASTOLIC BLOOD PRESSURE: 83 MMHG | WEIGHT: 209 LBS | HEIGHT: 69 IN | SYSTOLIC BLOOD PRESSURE: 115 MMHG | HEART RATE: 80 BPM

## 2024-07-10 DIAGNOSIS — I10 ESSENTIAL HYPERTENSION: ICD-10-CM

## 2024-07-10 DIAGNOSIS — E11.3293 TYPE 2 DIABETES MELLITUS WITH BOTH EYES AFFECTED BY MILD NONPROLIFERATIVE RETINOPATHY WITHOUT MACULAR EDEMA, WITHOUT LONG-TERM CURRENT USE OF INSULIN (MULTI): ICD-10-CM

## 2024-07-10 DIAGNOSIS — I49.3 PVC (PREMATURE VENTRICULAR CONTRACTION): ICD-10-CM

## 2024-07-10 DIAGNOSIS — R93.1 AGATSTON CAC SCORE 100-199: ICD-10-CM

## 2024-07-10 DIAGNOSIS — I49.3 SYMPTOMATIC PVCS: Primary | ICD-10-CM

## 2024-07-10 DIAGNOSIS — E78.2 MIXED HYPERLIPIDEMIA: ICD-10-CM

## 2024-07-10 DIAGNOSIS — R93.1 ELEVATED CORONARY ARTERY CALCIUM SCORE: ICD-10-CM

## 2024-07-10 PROCEDURE — 3079F DIAST BP 80-89 MM HG: CPT | Performed by: INTERNAL MEDICINE

## 2024-07-10 PROCEDURE — 3061F NEG MICROALBUMINURIA REV: CPT | Performed by: INTERNAL MEDICINE

## 2024-07-10 PROCEDURE — 3074F SYST BP LT 130 MM HG: CPT | Performed by: INTERNAL MEDICINE

## 2024-07-10 PROCEDURE — 3049F LDL-C 100-129 MG/DL: CPT | Performed by: INTERNAL MEDICINE

## 2024-07-10 PROCEDURE — 1036F TOBACCO NON-USER: CPT | Performed by: INTERNAL MEDICINE

## 2024-07-10 PROCEDURE — 99204 OFFICE O/P NEW MOD 45 MIN: CPT | Performed by: INTERNAL MEDICINE

## 2024-07-10 PROCEDURE — 1159F MED LIST DOCD IN RCRD: CPT | Performed by: INTERNAL MEDICINE

## 2024-07-10 PROCEDURE — 3008F BODY MASS INDEX DOCD: CPT | Performed by: INTERNAL MEDICINE

## 2024-07-10 PROCEDURE — 1157F ADVNC CARE PLAN IN RCRD: CPT | Performed by: INTERNAL MEDICINE

## 2024-07-10 PROCEDURE — 4010F ACE/ARB THERAPY RXD/TAKEN: CPT | Performed by: INTERNAL MEDICINE

## 2024-07-10 NOTE — PROGRESS NOTES
CARDIOLOGY NEW PATIENT OFFICE VISIT    Patient:  Mario Mendenhall  YOB: 1950  MRN: [id number]       History of Present Illness:     Mario Mendenhall is a 74 y.o. male who is being seen today at the request of Dr. Brian Crow for evaluation of premature ventricular complexes.  He does not have any history of atherosclerotic heart or valvular heart disease.  He has a history of primary hypertension, hyperlipidemia, and type 2 diabetes mellitus.  He did have a coronary CT calcium score November 3, 2022 that was 161.  This placed him into an intermediate risk category for future coronary events.  He was incidentally noted to have mild enlargement of the ascending thoracic aorta measuring 3.8 cm.  He is currently taking aspirin, amlodipine, benazepril, Toprol-XL, and atorvastatin.     He was noted on his most recent visit with Dr. Crow to have modest frequency of PVCs at rest.  He notes occasional skipped beats.  He otherwise feels well.  He keeps very active.  He swims on a regular basis.  He denies any chest pain or shortness of breath.  He denies any orthopnea, PND, or increasing peripheral edema.  He denies any palpitations, lightheadedness, near-syncope, or syncope.  He denies any fever, chills, or cough.  He denies any nausea, vomiting, or diaphoresis.  He denies any hemoptysis, hematemesis, melena, or hematochezia.    Lab studies June 28, 2024 showed a normal CBC with exception of RBC 4.40.  Comprehensive metabolic profile was normal with exception of glucose 108, BUN 28, and creatinine 1.54.  Cholesterol 194 with HDL 68, , and triglycerides 123.  EKG April 24, 2024 showed normal sinus rhythm with occasional PVCs.  Other details as noted below.       Allergies:     No Known Allergies       Outpatient Medications:     Current Outpatient Medications   Medication Instructions    amLODIPine (NORVASC) 10 mg, oral, Daily    aspirin 81 mg, oral    atorvastatin (LIPITOR) 20 mg, oral, Daily     benazepril (Lotensin) 20 mg tablet TAKE ONE TABLET BY MOUTH TWO TIMES A DAY    blood sugar diagnostic (OneTouch Ultra Test) strip 1 strip, subcutaneous, Daily RT    coenzyme Q-10 100 mg capsule 1 capsule, oral, Daily    dapagliflozin propanediol (FARXIGA) 5 mg, oral, Daily    garlic 200 mg tablet 2 tablets, oral, Daily    metFORMIN XR (GLUCOPHAGE-XR) 500 mg, oral, 2 times daily (morning and late afternoon), Do not crush, chew, or split.    metoprolol succinate XL (TOPROL-XL) 25 mg, oral, Daily, Do not crush or chew.    multivitamin tablet 1 tablet, oral, Daily    omega 3-dha-epa-fish oil 1,200 (144-216) mg capsule oral    timolol (Timoptic) 0.5 % ophthalmic solution instill 1 drop into both eyes every morning    turmeric root extract 500 mg capsule 1 capsule, oral, Daily        Past Medical History:     No past medical history on file.      Social History:     Social History     Tobacco Use    Smoking status: Former     Types: Cigarettes    Smokeless tobacco: Never   Vaping Use    Vaping status: Never Used   Substance Use Topics    Alcohol use: Yes    Drug use: Never       Family History:     Family History   Problem Relation Name Age of Onset    No Known Problems Mother      Diabetes Father         Review of Systems:     12 point review of systems completed and is negative other than that detailed above.       Objective:     Vitals:    07/10/24 1322   BP: 115/83   Pulse: 80       Wt Readings from Last 4 Encounters:   07/10/24 94.8 kg (209 lb)   06/24/24 94.6 kg (208 lb 9.6 oz)   04/24/24 96 kg (211 lb 9.6 oz)   12/20/23 99.8 kg (220 lb)       Physical Examination:   GENERAL:  Well developed, well nourished, in no acute distress.  CHEST:  Symmetric and nontender.  NEURO/PSYCH:  Alert and oriented times three with approppriate behavior and responses.  NECK:  Supple, no JVD, no bruit.  LUNGS:  Clear to auscultation bilaterally, normal respiratory effort.  HEART:  Rate and rhythm regular with no evident murmur, no  gallop appreciated.        There are no rubs, clicks or heaves.  EXTREMITIES:  Warm with good color, no clubbing or cyanosis.  There is no edema noted.  PERIPHERAL VASCULAR:  Pulses present and equally palpable; 2+ throughout.      Lab:     CBC:   Lab Results   Component Value Date    WBC 6.6 06/28/2024    RBC 4.40 (L) 06/28/2024    HGB 13.8 06/28/2024    HCT 41.3 06/28/2024     06/28/2024        CMP:    Lab Results   Component Value Date     06/28/2024    K 4.8 06/28/2024     06/28/2024    CO2 26 06/28/2024    BUN 28 (H) 06/28/2024    CREATININE 1.54 (H) 06/28/2024    GLUCOSE 108 (H) 06/28/2024    CALCIUM 9.4 06/28/2024       Lipid Profile:    Lab Results   Component Value Date    TRIG 123 06/28/2024    HDL 68.7 06/28/2024    LDLCALC 101 (H) 06/28/2024       BMP:  Lab Results   Component Value Date     06/28/2024     06/22/2023     02/11/2022     03/26/2021    K 4.8 06/28/2024    K 5.0 06/22/2023    K 4.3 02/11/2022    K 4.6 03/26/2021     06/28/2024     06/22/2023     02/11/2022     03/26/2021    CO2 26 06/28/2024    CO2 25 06/22/2023    CO2 30 02/11/2022    CO2 29 03/26/2021    BUN 28 (H) 06/28/2024    BUN 23 06/22/2023    BUN 14 02/11/2022    BUN 16 03/26/2021    CREATININE 1.54 (H) 06/28/2024    CREATININE 1.45 (H) 06/22/2023    CREATININE 1.37 (H) 02/11/2022    CREATININE 1.29 03/26/2021       CBC:  Lab Results   Component Value Date    WBC 6.6 06/28/2024    WBC 7.9 06/22/2023    WBC 7.5 02/11/2022    WBC 6.5 03/26/2021    RBC 4.40 (L) 06/28/2024    RBC 4.08 (L) 06/22/2023    RBC 4.13 (L) 02/11/2022    RBC 4.19 (L) 03/26/2021    HGB 13.8 06/28/2024    HGB 12.8 (L) 06/22/2023    HGB 12.8 (L) 02/11/2022    HGB 13.1 (L) 03/26/2021    HCT 41.3 06/28/2024    HCT 36.7 (L) 06/22/2023    HCT 39.3 (L) 02/11/2022    HCT 39.8 (L) 03/26/2021    MCV 94 06/28/2024    MCV 90 06/22/2023    MCV 95 02/11/2022    MCV 95 03/26/2021    MCH 31.4 06/28/2024    MCHC  33.4 06/28/2024    MCHC 34.9 06/22/2023    MCHC 32.6 02/11/2022    MCHC 32.9 03/26/2021    RDW 13.2 06/28/2024    RDW 12.9 06/22/2023    RDW 12.6 02/11/2022    RDW 13.1 03/26/2021     06/28/2024     06/22/2023     02/11/2022     03/26/2021       Hepatic Function Panel:    Lab Results   Component Value Date    ALKPHOS 58 06/28/2024    ALT 19 06/28/2024    AST 19 06/28/2024    PROT 7.1 06/28/2024    BILITOT 0.9 06/28/2024       HgBA1c:    Lab Results   Component Value Date    HGBA1C 6.5 04/24/2024       Problem List:     Patient Active Problem List   Diagnosis    Diabetes (Multi)    Elevated PSA    Glaucoma (increased eye pressure)    HTN (hypertension)    Hyperlipidemia    Traumatic avulsion of nail plate of toe    Pain of right great toe    Age-related nuclear cataract of both eyes    Cortical age-related cataract of both eyes    Astigmatism of both eyes    Benign prostatic hyperplasia    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    Erectile dysfunction    Myopia, bilateral    Presbyopia    Vitreous degeneration of both eyes    Type 2 diabetes mellitus with mild nonproliferative retinopathy of both eyes without macular edema (Multi)    Essential hypertension    BMI 30.0-30.9,adult    Class 1 obesity due to excess calories without serious comorbidity with body mass index (BMI) of 30.0 to 30.9 in adult    Medicare annual wellness visit, subsequent       Assessment:     Problem List Items Addressed This Visit             ICD-10-CM    Hyperlipidemia E78.5    Type 2 diabetes mellitus with mild nonproliferative retinopathy of both eyes without macular edema (Multi) E11.3293    Essential hypertension I10    Agatston CAC score 100-199 R93.1    Symptomatic PVCs - Primary I49.3     Other Visit Diagnoses         Codes    PVC (premature ventricular contraction)     I49.3    Relevant Orders    Stress Test    Holter or Event Cardiac Monitor    Follow Up In Cardiology             Plan:     In light of known ASHD and PVC's he will be scheduled for an exercise treadmill test to assure suppression of the dysrhythmia with exercise and exclude flow limiting disease.     He will be scheduled for a 24 hour holter monitor to better quantify frequency of the PVC's.      We discussed the importance of optimals risk factor modification.     He will be continued on his same medications.     He was advised to restrict sodium with an aim of no more than 2 grams per day.     He was advised on the need for regular exercise.    He was advised on the need to follow a Mediterranean style diet.    Follow-up as scheduled.

## 2024-07-11 ENCOUNTER — TELEPHONE (OUTPATIENT)
Dept: PRIMARY CARE | Facility: CLINIC | Age: 74
End: 2024-07-11
Payer: MEDICARE

## 2024-07-11 NOTE — TELEPHONE ENCOUNTER
----- Message from Brian Crow sent at 7/11/2024  1:38 PM EDT -----  Normal ultrasound of his kidneys, which is great news make sure he drinks enough fluids no Advil no Aleve, and let us know over next month or so how his blood pressures are doing

## 2024-08-07 ENCOUNTER — CLINICAL SUPPORT (OUTPATIENT)
Dept: CARDIOLOGY | Facility: HOSPITAL | Age: 74
End: 2024-08-07
Payer: MEDICARE

## 2024-08-07 ENCOUNTER — APPOINTMENT (OUTPATIENT)
Dept: CARDIOLOGY | Facility: CLINIC | Age: 74
End: 2024-08-07
Payer: MEDICARE

## 2024-08-07 DIAGNOSIS — I49.3 PVC (PREMATURE VENTRICULAR CONTRACTION): ICD-10-CM

## 2024-08-07 DIAGNOSIS — R93.1 ELEVATED CORONARY ARTERY CALCIUM SCORE: ICD-10-CM

## 2024-08-07 PROCEDURE — 93016 CV STRESS TEST SUPVJ ONLY: CPT | Performed by: INTERNAL MEDICINE

## 2024-08-07 PROCEDURE — 93018 CV STRESS TEST I&R ONLY: CPT | Performed by: INTERNAL MEDICINE

## 2024-08-07 PROCEDURE — 93017 CV STRESS TEST TRACING ONLY: CPT

## 2024-08-09 ENCOUNTER — TELEPHONE (OUTPATIENT)
Dept: CARDIOLOGY | Facility: CLINIC | Age: 74
End: 2024-08-09
Payer: MEDICARE

## 2024-08-09 NOTE — TELEPHONE ENCOUNTER
----- Message from Uri Thompson sent at 8/9/2024  4:26 PM EDT -----  Stress test reviewed and is normal.  Nothing to suggest any significant heart artery blockage.  Continue same and follow-up as scheduled.  Thanks.

## 2024-08-12 ENCOUNTER — HOSPITAL ENCOUNTER (OUTPATIENT)
Dept: CARDIOLOGY | Facility: CLINIC | Age: 74
Discharge: HOME | End: 2024-08-12
Payer: MEDICARE

## 2024-08-12 DIAGNOSIS — R93.1 ELEVATED CORONARY ARTERY CALCIUM SCORE: ICD-10-CM

## 2024-08-12 DIAGNOSIS — I49.3 PVC (PREMATURE VENTRICULAR CONTRACTION): ICD-10-CM

## 2024-08-14 ENCOUNTER — APPOINTMENT (OUTPATIENT)
Dept: CARDIOLOGY | Facility: CLINIC | Age: 74
End: 2024-08-14
Payer: MEDICARE

## 2024-08-14 VITALS
BODY MASS INDEX: 30.96 KG/M2 | HEIGHT: 69 IN | SYSTOLIC BLOOD PRESSURE: 126 MMHG | HEART RATE: 85 BPM | WEIGHT: 209 LBS | DIASTOLIC BLOOD PRESSURE: 82 MMHG

## 2024-08-14 DIAGNOSIS — I49.3 PVC (PREMATURE VENTRICULAR CONTRACTION): ICD-10-CM

## 2024-08-14 DIAGNOSIS — E78.49 OTHER HYPERLIPIDEMIA: ICD-10-CM

## 2024-08-14 DIAGNOSIS — I49.3 SYMPTOMATIC PVCS: Primary | ICD-10-CM

## 2024-08-14 DIAGNOSIS — R93.1 AGATSTON CAC SCORE 100-199: ICD-10-CM

## 2024-08-14 DIAGNOSIS — R93.1 ELEVATED CORONARY ARTERY CALCIUM SCORE: ICD-10-CM

## 2024-08-14 DIAGNOSIS — I10 PRIMARY HYPERTENSION: ICD-10-CM

## 2024-08-14 DIAGNOSIS — R01.1 SYSTOLIC MURMUR: ICD-10-CM

## 2024-08-14 PROCEDURE — 3049F LDL-C 100-129 MG/DL: CPT | Performed by: INTERNAL MEDICINE

## 2024-08-14 PROCEDURE — 99214 OFFICE O/P EST MOD 30 MIN: CPT | Performed by: INTERNAL MEDICINE

## 2024-08-14 PROCEDURE — 3074F SYST BP LT 130 MM HG: CPT | Performed by: INTERNAL MEDICINE

## 2024-08-14 PROCEDURE — 4010F ACE/ARB THERAPY RXD/TAKEN: CPT | Performed by: INTERNAL MEDICINE

## 2024-08-14 PROCEDURE — 3008F BODY MASS INDEX DOCD: CPT | Performed by: INTERNAL MEDICINE

## 2024-08-14 PROCEDURE — 3061F NEG MICROALBUMINURIA REV: CPT | Performed by: INTERNAL MEDICINE

## 2024-08-14 PROCEDURE — 1157F ADVNC CARE PLAN IN RCRD: CPT | Performed by: INTERNAL MEDICINE

## 2024-08-14 PROCEDURE — 3079F DIAST BP 80-89 MM HG: CPT | Performed by: INTERNAL MEDICINE

## 2024-08-14 PROCEDURE — 1159F MED LIST DOCD IN RCRD: CPT | Performed by: INTERNAL MEDICINE

## 2024-08-14 PROCEDURE — 1036F TOBACCO NON-USER: CPT | Performed by: INTERNAL MEDICINE

## 2024-08-14 NOTE — PROGRESS NOTES
Patient:  Mario Mendenhall  YOB: 1950  MRN: [id number]       HPI:       Mario Mendenhall is a 74 y.o. male who returns today for cardiac follow-up.  He was seen on July 10, 2024 for evaluation of PVCs.  He does not have any history of atherosclerotic heart or valvular heart disease.  He has a history of primary hypertension, hyperlipidemia, and type 2 diabetes mellitus.  He did have a coronary CT calcium score November 3, 2022 that was 161.  This placed him into an intermediate risk category for future coronary events.  He was incidentally noted to have mild enlargement of the ascending thoracic aorta measuring 3.8 cm.  He is maintained on aspirin, amlodipine, benazepril, Toprol-XL, and atorvastatin.      He was noted on a recent office visit to have modest frequency of PVCs at rest.  He described occasional skipped beats.  He was otherwise feeling well.  He swims on a regular basis.  An exercise treadmill test on August 7, 2024 was negative for any exercise-induced chest discomfort or ST changes.  He achieved 102% of MPHR at an energy level of 8.5 METS.  Mildly excessive chronotropic and blood pressure response.  Peak blood pressure 180/90 mmHg.  No significant dysrhythmias.  Holter monitor results are currently pending.    He has been doing well since his last visit.  He denies any chest pain or shortness of breath.  He denies any orthopnea, PND, or increasing peripheral edema.  He denies any palpitations, lightheadedness, near-syncope, or syncope.  He denies any fever, chills, or cough.  He denies any nausea, vomiting, or diaphoresis.  He denies any hemoptysis, hematemesis, melena, or hematochezia.  Lab studies June 28, 2024 showed a normal CBC with exception of RBC 4.40.  Comprehensive metabolic profile was normal with exception of glucose 108, BUN 28, and creatinine 1.54.  Cholesterol 194 with HDL 68, , and triglycerides 123.    In light of ventricular ectopy, abnormal EKG, and systolic  murmur he will be scheduled for an echocardiogram.  Will call him with Holter results once available.  Other details as noted below.     The above portion of this note was dictated by me using voice recognition software.  I personally performed the services described in the documentation.  The scribe entering the documentation below was in my presence.  I affirm that the information is both accurate and complete.      Vitals:    08/14/24 1534   BP: 126/82   Pulse: 85       Wt Readings from Last 4 Encounters:   08/14/24 94.8 kg (209 lb)   07/10/24 94.8 kg (209 lb)   06/24/24 94.6 kg (208 lb 9.6 oz)   04/24/24 96 kg (211 lb 9.6 oz)       Allergies:     No Known Allergies       Medications:     Current Outpatient Medications   Medication Instructions    amLODIPine (NORVASC) 10 mg, oral, Daily    aspirin 81 mg, oral    atorvastatin (LIPITOR) 20 mg, oral, Daily    benazepril (Lotensin) 20 mg tablet TAKE ONE TABLET BY MOUTH TWO TIMES A DAY    blood sugar diagnostic (OneTouch Ultra Test) strip 1 strip, subcutaneous, Daily RT    dapagliflozin propanediol (FARXIGA) 5 mg, oral, Daily    garlic 200 mg tablet 2 tablets, oral, Daily    metFORMIN XR (GLUCOPHAGE-XR) 500 mg, oral, 2 times daily (morning and late afternoon), Do not crush, chew, or split.    metoprolol succinate XL (TOPROL-XL) 25 mg, oral, Daily, Do not crush or chew.    multivitamin tablet 1 tablet, oral, Daily    omega 3-dha-epa-fish oil 1,200 (144-216) mg capsule oral    timolol (Timoptic) 0.5 % ophthalmic solution instill 1 drop into both eyes every morning    turmeric root extract 500 mg capsule 1 capsule, oral, Daily       Physical Examination:   GENERAL:  Well developed, well nourished, in no acute distress.  CHEST:  Symmetric and nontender.  NEURO/PSYCH:  Alert and oriented times three with approppriate behavior and responses.  NECK:  Supple, no JVD, no bruit.  LUNGS:  Clear to auscultation bilaterally, normal respiratory effort.  HEART:  Rate and rhythm  regular with atrial ectopy, II/VI CRISTOBAL RUSB, no gallop appreciated.        There are no rubs, clicks or heaves.  EXTREMITIES:  Warm with good color, no clubbing or cyanosis.  There is no edema noted.  PERIPHERAL VASCULAR:  Pulses present and equally palpable; 2+ throughout.      Lab:     CBC:   Lab Results   Component Value Date    WBC 6.6 06/28/2024    RBC 4.40 (L) 06/28/2024    HGB 13.8 06/28/2024    HCT 41.3 06/28/2024     06/28/2024        CMP:    Lab Results   Component Value Date     06/28/2024    K 4.8 06/28/2024     06/28/2024    CO2 26 06/28/2024    BUN 28 (H) 06/28/2024    CREATININE 1.54 (H) 06/28/2024    GLUCOSE 108 (H) 06/28/2024    CALCIUM 9.4 06/28/2024       Lipid Profile:    Lab Results   Component Value Date    TRIG 123 06/28/2024    HDL 68.7 06/28/2024    LDLCALC 101 (H) 06/28/2024       BMP:  Lab Results   Component Value Date     06/28/2024     06/22/2023     02/11/2022     03/26/2021    K 4.8 06/28/2024    K 5.0 06/22/2023    K 4.3 02/11/2022    K 4.6 03/26/2021     06/28/2024     06/22/2023     02/11/2022     03/26/2021    CO2 26 06/28/2024    CO2 25 06/22/2023    CO2 30 02/11/2022    CO2 29 03/26/2021    BUN 28 (H) 06/28/2024    BUN 23 06/22/2023    BUN 14 02/11/2022    BUN 16 03/26/2021    CREATININE 1.54 (H) 06/28/2024    CREATININE 1.45 (H) 06/22/2023    CREATININE 1.37 (H) 02/11/2022    CREATININE 1.29 03/26/2021       CBC:  Lab Results   Component Value Date    WBC 6.6 06/28/2024    WBC 7.9 06/22/2023    WBC 7.5 02/11/2022    WBC 6.5 03/26/2021    RBC 4.40 (L) 06/28/2024    RBC 4.08 (L) 06/22/2023    RBC 4.13 (L) 02/11/2022    RBC 4.19 (L) 03/26/2021    HGB 13.8 06/28/2024    HGB 12.8 (L) 06/22/2023    HGB 12.8 (L) 02/11/2022    HGB 13.1 (L) 03/26/2021    HCT 41.3 06/28/2024    HCT 36.7 (L) 06/22/2023    HCT 39.3 (L) 02/11/2022    HCT 39.8 (L) 03/26/2021    MCV 94 06/28/2024    MCV 90 06/22/2023    MCV 95 02/11/2022    MCV  95 03/26/2021    MCH 31.4 06/28/2024    MCHC 33.4 06/28/2024    MCHC 34.9 06/22/2023    MCHC 32.6 02/11/2022    MCHC 32.9 03/26/2021    RDW 13.2 06/28/2024    RDW 12.9 06/22/2023    RDW 12.6 02/11/2022    RDW 13.1 03/26/2021     06/28/2024     06/22/2023     02/11/2022     03/26/2021       Hepatic Function Panel:    Lab Results   Component Value Date    ALKPHOS 58 06/28/2024    ALT 19 06/28/2024    AST 19 06/28/2024    PROT 7.1 06/28/2024    BILITOT 0.9 06/28/2024       HgBA1c:    Lab Results   Component Value Date    HGBA1C 6.5 04/24/2024       Problem List:     Patient Active Problem List   Diagnosis    Diabetes (Multi)    Elevated PSA    Glaucoma (increased eye pressure)    HTN (hypertension)    Hyperlipidemia    Traumatic avulsion of nail plate of toe    Pain of right great toe    Age-related nuclear cataract of both eyes    Cortical age-related cataract of both eyes    Astigmatism of both eyes    Benign prostatic hyperplasia    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    Erectile dysfunction    Myopia, bilateral    Presbyopia    Vitreous degeneration of both eyes    Type 2 diabetes mellitus with mild nonproliferative retinopathy of both eyes without macular edema (Multi)    Essential hypertension    BMI 30.0-30.9,adult    Class 1 obesity due to excess calories without serious comorbidity with body mass index (BMI) of 30.0 to 30.9 in adult    Medicare annual wellness visit, subsequent    Agatston CAC score 100-199    Symptomatic PVCs       Asessment:     Problem List Items Addressed This Visit             ICD-10-CM    HTN (hypertension) I10    Relevant Orders    Follow Up In Cardiology    Hyperlipidemia E78.5    Relevant Orders    Follow Up In Cardiology    Agatston CAC score 100-199 R93.1    Relevant Orders    Follow Up In Cardiology    Symptomatic PVCs - Primary I49.3    Relevant Orders    Follow Up In Cardiology     Other Visit Diagnoses         Codes    PVC  (premature ventricular contraction)     I49.3    Relevant Orders    Follow Up In Cardiology    Transthoracic Echo (TTE) Complete    Elevated coronary artery calcium score     R93.1    Relevant Orders    Follow Up In Cardiology    Systolic murmur     R01.1    Relevant Orders    Follow Up In Cardiology    Transthoracic Echo (TTE) Complete

## 2024-08-15 ENCOUNTER — TELEPHONE (OUTPATIENT)
Dept: PRIMARY CARE | Facility: CLINIC | Age: 74
End: 2024-08-15
Payer: MEDICARE

## 2024-08-15 NOTE — TELEPHONE ENCOUNTER
Here are results of monitoring my blood pressure for a couple of weeks.  Highest  - 146/79  Lowest 121/73

## 2024-08-15 NOTE — TELEPHONE ENCOUNTER
Continue what he is doing and see me in November thank you very much         [employer] message sent

## 2024-08-20 LAB — BODY SURFACE AREA: 2.15 M2

## 2024-08-26 ENCOUNTER — TELEPHONE (OUTPATIENT)
Dept: CARDIOLOGY | Facility: CLINIC | Age: 74
End: 2024-08-26
Payer: MEDICARE

## 2024-08-26 DIAGNOSIS — E78.2 MIXED HYPERLIPIDEMIA: ICD-10-CM

## 2024-08-26 DIAGNOSIS — I49.3 SYMPTOMATIC PVCS: ICD-10-CM

## 2024-08-26 NOTE — TELEPHONE ENCOUNTER
----- Message from Uri Thompson sent at 8/23/2024  5:04 PM EDT -----  24-hour Holter monitor reviewed and shows normal rhythm with an average heart rate of 76 bpm.  Heart rates ranged from 56 to 110 bpm.  Quite frequent benign extra beats comprising 11% of the overall rhythm.  Please advise him to increase Toprol-XL to 50 mg daily.  In light of frequent ectopy I would like to make a referral to EP to get their opinion as well.  Thanks.  ----- Message -----  From: Gary Boyer MD  Sent: 8/20/2024   9:29 AM EDT  To: Uri Thompson MD

## 2024-08-26 NOTE — TELEPHONE ENCOUNTER
Called and was unable to get a hold of the patient. Left a voicemail for the patient to call the office back.    PAST SURGICAL HISTORY:  No significant past surgical history

## 2024-08-26 NOTE — LETTER
August 28, 2024     Patient: Ed Mendenhall   YOB: 1950   Date of Visit: 8/26/2024       Dear Mr Mendenhall    You recently had a HOLTER MONITOR ordered by Dr. GAMBOA, on 8/2024  We have been trying to reach you by phone to discuss these results.  Please contact us at [phone]. PRESS OPTION , THEN choose option 4     Sincerely,   [facility] [place] [facility]

## 2024-08-26 NOTE — TELEPHONE ENCOUNTER
"MEDICATION PENDED  Rx Refill Request Telephone Encounter    Name:  Mario Mendenhall \"Ed\"  : 1950     Medication Name:  atorvasratin  Dose (Optional):    20 mg  Quantity (Optional):    90  Directions (Optional):   Take 1 tablet (20 mg) by mouth once daily.     ALLERGIES:   nka    Specific Pharmacy location:  [id number] [place] [address]     Date of last appointment:  24  Date of next appointment:      Best number to reach patient:  [phone]    "

## 2024-08-27 RX ORDER — ATORVASTATIN CALCIUM 20 MG/1
20 TABLET, FILM COATED ORAL DAILY
Qty: 90 TABLET | Refills: 1 | Status: SHIPPED | OUTPATIENT
Start: 2024-08-27

## 2024-08-28 NOTE — TELEPHONE ENCOUNTER
Called to both patient and wife. Discussed results. Agreeable with plan and verbalized good understanding. Med list updated and e-scribed to [facility] as requested. Order for referral placed.

## 2024-08-29 RX ORDER — METOPROLOL SUCCINATE 50 MG/1
50 TABLET, EXTENDED RELEASE ORAL DAILY
Qty: 90 TABLET | Refills: 3 | Status: SHIPPED | OUTPATIENT
Start: 2024-08-29 | End: 2025-08-29

## 2024-09-03 ENCOUNTER — APPOINTMENT (OUTPATIENT)
Dept: PRIMARY CARE | Facility: CLINIC | Age: 74
End: 2024-09-03
Payer: MEDICARE

## 2024-09-03 ENCOUNTER — TELEPHONE (OUTPATIENT)
Dept: PRIMARY CARE | Facility: CLINIC | Age: 74
End: 2024-09-03

## 2024-09-03 DIAGNOSIS — E11.3293 TYPE 2 DIABETES MELLITUS WITH BOTH EYES AFFECTED BY MILD NONPROLIFERATIVE RETINOPATHY WITHOUT MACULAR EDEMA, WITHOUT LONG-TERM CURRENT USE OF INSULIN (MULTI): ICD-10-CM

## 2024-09-03 LAB — POC HEMOGLOBIN A1C: 6.3 % (ref 4.2–6.5)

## 2024-09-03 PROCEDURE — 83036 HEMOGLOBIN GLYCOSYLATED A1C: CPT | Performed by: FAMILY MEDICINE

## 2024-09-03 NOTE — TELEPHONE ENCOUNTER
Patient presents in office today for an A1c.   Last A1c was 4/24/24 resulted at 6.5  Patient is currently taking Metformin and Farxiga.  Today's A1c was 6.3%.    PLEASE ADVISE

## 2024-09-03 NOTE — PROGRESS NOTES
Patient presents in office today for an A1c.   Last A1c was 4/24/24 resulted at 6.5  Patient is currently taking Metformin and Farxiga.  Today's A1c was 6.3%.    Result was forwarded to Dr. Crow.

## 2024-09-04 NOTE — TELEPHONE ENCOUNTER
Brian Crow, DO  You9 minutes ago (12:34 PM)       Sugars are stable follow-up in the office with me before end of the year

## 2024-09-12 ENCOUNTER — ANCILLARY PROCEDURE (OUTPATIENT)
Dept: CARDIOLOGY | Facility: HOSPITAL | Age: 74
End: 2024-09-12
Payer: MEDICARE

## 2024-09-12 DIAGNOSIS — R01.1 SYSTOLIC MURMUR: ICD-10-CM

## 2024-09-12 DIAGNOSIS — I49.3 PVC (PREMATURE VENTRICULAR CONTRACTION): ICD-10-CM

## 2024-09-12 PROCEDURE — 93306 TTE W/DOPPLER COMPLETE: CPT | Performed by: INTERNAL MEDICINE

## 2024-09-12 PROCEDURE — 93306 TTE W/DOPPLER COMPLETE: CPT

## 2024-09-13 LAB
AORTIC VALVE MEAN GRADIENT: 7 MMHG
AORTIC VALVE PEAK VELOCITY: 1.9 M/S
AV PEAK GRADIENT: 14.4 MMHG
AVA (PEAK VEL): 3.72 CM2
AVA (VTI): 3.67 CM2
EJECTION FRACTION APICAL 4 CHAMBER: 66.1
EJECTION FRACTION: 63 %
LEFT VENTRICLE INTERNAL DIMENSION DIASTOLE: 4.78 CM (ref 3.5–6)
LEFT VENTRICULAR OUTFLOW TRACT DIAMETER: 2.5 CM
LV EJECTION FRACTION BIPLANE: 64 %
MITRAL VALVE E/A RATIO: 1.01

## 2024-09-20 ENCOUNTER — APPOINTMENT (OUTPATIENT)
Dept: CARDIOLOGY | Facility: CLINIC | Age: 74
End: 2024-09-20
Payer: MEDICARE

## 2024-09-23 DIAGNOSIS — I10 PRIMARY HYPERTENSION: ICD-10-CM

## 2024-09-23 DIAGNOSIS — E11.8 CONTROLLED TYPE 2 DIABETES MELLITUS WITH COMPLICATION, WITHOUT LONG-TERM CURRENT USE OF INSULIN (MULTI): ICD-10-CM

## 2024-09-23 RX ORDER — METFORMIN HYDROCHLORIDE 500 MG/1
500 TABLET, EXTENDED RELEASE ORAL
Qty: 180 TABLET | Refills: 1 | Status: SHIPPED | OUTPATIENT
Start: 2024-09-23

## 2024-09-23 RX ORDER — BENAZEPRIL HYDROCHLORIDE 20 MG/1
TABLET ORAL
Qty: 180 TABLET | Refills: 1 | Status: SHIPPED | OUTPATIENT
Start: 2024-09-23

## 2024-09-23 NOTE — TELEPHONE ENCOUNTER
"as Za \"Ed\"  P Do [id number] [facility] Clinical Support Staff (supporting Brian Crow DO)3 hours ago (12:26 PM)       Dr Crow, I would like to get  refills on the following prescriptions. They always run out long before the others because I take them twice a day.  MetforminHydrochloride 500 mg, and Benazepril Hydrochloride 20 mg.     Thank You    Medication(s) pended for Dr. Crow    "

## 2024-09-27 ENCOUNTER — OFFICE VISIT (OUTPATIENT)
Dept: CARDIOLOGY | Facility: CLINIC | Age: 74
End: 2024-09-27
Payer: MEDICARE

## 2024-09-27 VITALS
WEIGHT: 211 LBS | OXYGEN SATURATION: 98 % | DIASTOLIC BLOOD PRESSURE: 85 MMHG | BODY MASS INDEX: 31.25 KG/M2 | SYSTOLIC BLOOD PRESSURE: 144 MMHG | TEMPERATURE: 97.7 F | HEIGHT: 69 IN | HEART RATE: 94 BPM

## 2024-09-27 DIAGNOSIS — I49.3 SYMPTOMATIC PVCS: ICD-10-CM

## 2024-09-27 PROCEDURE — 3077F SYST BP >= 140 MM HG: CPT | Performed by: INTERNAL MEDICINE

## 2024-09-27 PROCEDURE — 3079F DIAST BP 80-89 MM HG: CPT | Performed by: INTERNAL MEDICINE

## 2024-09-27 PROCEDURE — 1159F MED LIST DOCD IN RCRD: CPT | Performed by: INTERNAL MEDICINE

## 2024-09-27 PROCEDURE — 93005 ELECTROCARDIOGRAM TRACING: CPT | Performed by: INTERNAL MEDICINE

## 2024-09-27 PROCEDURE — 3008F BODY MASS INDEX DOCD: CPT | Performed by: INTERNAL MEDICINE

## 2024-09-27 PROCEDURE — 99215 OFFICE O/P EST HI 40 MIN: CPT | Performed by: INTERNAL MEDICINE

## 2024-09-27 PROCEDURE — 93010 ELECTROCARDIOGRAM REPORT: CPT | Performed by: INTERNAL MEDICINE

## 2024-09-27 PROCEDURE — 3049F LDL-C 100-129 MG/DL: CPT | Performed by: INTERNAL MEDICINE

## 2024-09-27 PROCEDURE — 99205 OFFICE O/P NEW HI 60 MIN: CPT | Performed by: INTERNAL MEDICINE

## 2024-09-27 PROCEDURE — 3061F NEG MICROALBUMINURIA REV: CPT | Performed by: INTERNAL MEDICINE

## 2024-09-27 PROCEDURE — 1157F ADVNC CARE PLAN IN RCRD: CPT | Performed by: INTERNAL MEDICINE

## 2024-09-27 PROCEDURE — 4010F ACE/ARB THERAPY RXD/TAKEN: CPT | Performed by: INTERNAL MEDICINE

## 2024-09-27 ASSESSMENT — PATIENT HEALTH QUESTIONNAIRE - PHQ9
SUM OF ALL RESPONSES TO PHQ9 QUESTIONS 1 AND 2: 0
1. LITTLE INTEREST OR PLEASURE IN DOING THINGS: NOT AT ALL
2. FEELING DOWN, DEPRESSED OR HOPELESS: NOT AT ALL

## 2024-09-27 NOTE — PROGRESS NOTES
Referring Provider: Uri Thompson MD  Reason for Consult: Symptomatic PVCs    History of Present Illness:      Mario Mendenhall is a 74 y.o. year old male patient with a history significant for hypertension, hyperlipidemia, type 2 diabetes who is referred by Dr. Thompson for symptomatic PACs.    He recently saw his primary care physician who noted an irregular heartbeat on exam.  He has occasional palpitations but these are not particularly bothersome and have no impact on his quality of life.  He is otherwise quite active.  He then had a 24-hour Holter monitor which showed less than 1% burden of PVCs as well as an 11% burden of PACs.  He had an exercise treadmill test that showed no inducible ischemia.  He did have frequent PACs during the stress test on my review of the EKGs, but this did not affect his exertional capacity.    Overall, he is doing very well and has no significant cardiac symptoms.  His wife does note that he frequently snores at night and possibly has some episodes of apnea.    Focused Cardiovascular Problem List:  Frequent PACs: 11% burden on most recent monitoring  Hypertension  Hyperlipidemia  Type 2 diabetes      Past Medical and Surgical History:  Mr. Mendenhall  has no past medical history on file.    has no past surgical history on file.    Social History:  Social History     Tobacco Use    Smoking status: Former     Types: Cigarettes    Smokeless tobacco: Never   Substance Use Topics    Alcohol use: Yes      Tobacco: Denies  Alcohol: Social  Drug use:  Denies      Relevant Family History:   Family History   Problem Relation Name Age of Onset    No Known Problems Mother      Diabetes Father       Allergies:  No Known Allergies     Medications:  Current Outpatient Medications   Medication Instructions    amLODIPine (NORVASC) 10 mg, oral, Daily    aspirin 81 mg, oral    atorvastatin (LIPITOR) 20 mg, oral, Daily    benazepril (Lotensin) 20 mg tablet TAKE ONE TABLET BY MOUTH TWO  "TIMES A DAY    blood sugar diagnostic (OneTouch Ultra Test) strip 1 strip, subcutaneous, Daily RT    dapagliflozin propanediol (FARXIGA) 5 mg, oral, Daily    garlic 200 mg tablet 2 tablets, oral, Daily    metFORMIN XR (GLUCOPHAGE-XR) 500 mg, oral, 2 times daily (morning and late afternoon), Do not crush, chew, or split.    metoprolol succinate XL (TOPROL-XL) 50 mg, oral, Daily, Do not crush or chew.    multivitamin tablet 1 tablet, oral, Daily    omega 3-dha-epa-fish oil 1,200 (144-216) mg capsule oral    timolol (Timoptic) 0.5 % ophthalmic solution instill 1 drop into both eyes every morning    turmeric root extract 500 mg capsule 1 capsule, oral, Daily         Objective   Physical Exam:  Last Recorded Vitals:      6/5/2023    12:24 PM 7/24/2023    10:03 AM 12/20/2023     1:00 PM 4/24/2024     7:48 AM 6/24/2024    12:53 PM 7/10/2024     1:22 PM 8/14/2024     3:34 PM   Vitals   Systolic 158 136 128 162 148 115 126   Diastolic 72 74 88 74 70 83 82   Heart Rate  70 102 87 71 80 85   Temp   36.6 °C (97.9 °F) 36.8 °C (98.2 °F) 36.2 °C (97.1 °F)     Resp  16 16 16 16     Height (in)  1.753 m (5' 9\") 1.753 m (5' 9\") 1.753 m (5' 9\") 1.753 m (5' 9\") 1.753 m (5' 9\") 1.753 m (5' 9\")   Weight (lb)  214 220 211.6 208.6 209 209   BMI  31.6 kg/m2 32.49 kg/m2 31.25 kg/m2 30.8 kg/m2 30.86 kg/m2 30.86 kg/m2   BSA (m2)  2.17 m2 2.2 m2 2.16 m2 2.15 m2 2.15 m2 2.15 m2   Visit Report Report Report Report Report Report Report Report    Visit Vitals  Smoking Status Former      Gen: NAD, sitting comfortably  HEENT: NC/AT  Card: RRR, no m/r/g  Pulm: Clear to auscultation bilaterally  Ext: No LE edema  Neuro: No focal deficits    Diagnostic Results      My Interpretation of Reviewed Study(s):  Prior ECGs (reviewed and my interpretation):   9/27/2024: Sinus rhythm with premature atrial complexes    Cardiac Rhythm Monitors:  8/2024: <1% burden of PVCs, 11% burden of PACs    Echocardiography:  9/13/2024: Normal LV ejection fraction, dilated " left atrium, mild mitral regurgitation, no other significant valvular disease    Stress Test:   8/8/2024: Normal exercise treadmill test, no significant arrhythmias but occasional PACs        Relevant Labs:  Lab Results   Component Value Date    CREATININE 1.54 (H) 06/28/2024    CREATININE 1.45 (H) 06/22/2023    CREATININE 1.37 (H) 02/11/2022    K 4.8 06/28/2024    K 5.0 06/22/2023    K 4.3 02/11/2022    HGBA1C 6.3 09/03/2024    HGBA1C 6.5 04/24/2024    HGBA1C 7.3 (A) 12/20/2023    HGB 13.8 06/28/2024    HGB 12.8 (L) 06/22/2023    HGB 12.8 (L) 02/11/2022    AST 19 06/28/2024    AST 20 06/22/2023    AST 18 02/11/2022    ALT 19 06/28/2024    ALT 19 06/22/2023    ALT 17 02/11/2022       Assessment/Plan   Assessment and Plan:  Mario Mendenhall is a 74 y.o. year old male patient who is referred for management and evaluation of frequent PACs noted on recent 24-hour Holter monitoring.  He had an 11% burden on this 24-hour Holter.  Otherwise, he has a structurally normal heart with a normal ejection fraction and no symptoms related to these PACs.  I personally reviewed his monitor results as well as his EKG treadmill stress test.    I spent much of the visit reassuring the patient that these PACs are benign typically do not have much of an impact on cardiac function and have no impact on long-term outcomes.  I would like to get a 7-day patch monitor to get a better sense of what his burden of PACs are, as 24-hour monitoring can be significantly influenced by daily variations.  He does have a dilated left atrium, and some symptoms that are suggestive of sleep apnea, so I think it would be prudent to obtain a home sleep apnea test to rule out obstructive sleep apnea.  Ultimately, there is an association between frequent PACs and short runs of atrial tachycardia to the eventual development of atrial fibrillation, and given his concomitant dilated left atrium, this is a possibility in the future.  Therefore, risk modification of  something like sleep apnea would be prudent.    Recommendations:  - Continue metoprolol XL 50 mg daily  - Home sleep apnea test to rule out obstructive sleep apnea based on risk factors and symptoms screening  - 7-day patch monitor to assess burden of premature atrial contractions  - Will follow-up results of patch monitor, if no significant increase in burden of PACs then would be okay to follow-up with general cardiology.  Would be happy to see him in the future if further issues arise      Return to Clinic:  As needed    Thank you very much for allowing me to participate in the care of this patient. Please do not hesitate to contact me with any further questions or concerns.    Blanca Garcia MD  Clinical Cardiac Electrophysiologist, [facility]    of Medicine, [employer]  Director of Atrial Fibrillation Ablation, [facility]  Director of Ventricular Arrhythmias Research, [facility]  Office Phone Number: [phone]

## 2024-10-15 ENCOUNTER — PATIENT MESSAGE (OUTPATIENT)
Dept: PRIMARY CARE | Facility: CLINIC | Age: 74
End: 2024-10-15
Payer: MEDICARE

## 2024-10-22 ENCOUNTER — CLINICAL SUPPORT (OUTPATIENT)
Dept: SLEEP MEDICINE | Facility: HOSPITAL | Age: 74
End: 2024-10-22
Payer: MEDICARE

## 2024-10-22 DIAGNOSIS — I49.3 SYMPTOMATIC PVCS: ICD-10-CM

## 2024-10-22 NOTE — PROGRESS NOTES
Type of Study: HOME SLEEP STUDY - NOMAD     The patient received equipment and instructions for use of the [facility] Nomad HSAT 4011 device. The patient was instructed how to apply the effort belts, cannula, thermistor. It was also explained how the Nomad and oximeter components work.  The patient was asked to record their sleep for an 8-hour period.     The patient was informed of their responsibility for the device and acknowledged this by signing the HSAT device contract. The patient was asked to return the device on 10/23/2024 between the hours of 9am to the Sleep Center.     The patient was instructed to call 911 as usual for any medical- emergencies while at home.  The patient was also given a phone number for troubleshooting when using the device in case there were additional questions.

## 2024-10-23 NOTE — PROGRESS NOTES
Patient had an inconclusive HSAT sleep study due to recording failure/no data.    Patient should be rescheduled for repeat attempt of HSAT or an in-lab sleep study.

## 2024-10-30 DIAGNOSIS — I10 PRIMARY HYPERTENSION: ICD-10-CM

## 2024-10-30 DIAGNOSIS — E11.3293 TYPE 2 DIABETES MELLITUS WITH BOTH EYES AFFECTED BY MILD NONPROLIFERATIVE RETINOPATHY WITHOUT MACULAR EDEMA, WITHOUT LONG-TERM CURRENT USE OF INSULIN: ICD-10-CM

## 2024-10-30 RX ORDER — DAPAGLIFLOZIN 5 MG/1
5 TABLET, FILM COATED ORAL DAILY
Qty: 30 TABLET | Refills: 1 | Status: SHIPPED | OUTPATIENT
Start: 2024-10-30 | End: 2025-10-30

## 2024-10-30 RX ORDER — AMLODIPINE BESYLATE 10 MG/1
10 TABLET ORAL DAILY
Qty: 30 TABLET | Refills: 1 | Status: SHIPPED | OUTPATIENT
Start: 2024-10-30

## 2024-10-31 ENCOUNTER — TELEPHONE (OUTPATIENT)
Dept: CARDIOLOGY | Facility: CLINIC | Age: 74
End: 2024-10-31
Payer: MEDICARE

## 2024-11-01 ENCOUNTER — APPOINTMENT (OUTPATIENT)
Dept: CARDIOLOGY | Facility: CLINIC | Age: 74
End: 2024-11-01
Payer: MEDICARE

## 2024-11-01 DIAGNOSIS — I49.3 SYMPTOMATIC PVCS: ICD-10-CM

## 2024-11-01 PROCEDURE — 93246 EXT ECG>7D<15D RECORDING: CPT | Performed by: INTERNAL MEDICINE

## 2024-11-01 PROCEDURE — 93248 EXT ECG>7D<15D REV&INTERPJ: CPT | Performed by: INTERNAL MEDICINE

## 2024-11-06 ENCOUNTER — CLINICAL SUPPORT (OUTPATIENT)
Dept: SLEEP MEDICINE | Facility: HOSPITAL | Age: 74
End: 2024-11-06
Payer: MEDICARE

## 2024-11-06 DIAGNOSIS — R06.83 SNORING: ICD-10-CM

## 2024-11-06 DIAGNOSIS — I49.3 SYMPTOMATIC PVCS: ICD-10-CM

## 2024-11-06 NOTE — PROGRESS NOTES
Type of Study: HOME SLEEP STUDY - NOMAD     The patient received equipment and instructions for use of the [facility] Nomad HSAT 4007 device. The patient was instructed how to apply the effort belts, cannula, thermistor. It was also explained how the Nomad and oximeter components work.  The patient was asked to record their sleep for an 8-hour period.     The patient was informed of their responsibility for the device and acknowledged this by signing the HSAT device contract. The patient was asked to return the device on 11/7/2024 between the hours of 9am to the Sleep Center.     The patient was instructed to call 911 as usual for any medical- emergencies while at home.  The patient was also given a phone number for troubleshooting when using the device in case there were additional questions.    5

## 2024-12-16 ENCOUNTER — APPOINTMENT (OUTPATIENT)
Dept: PRIMARY CARE | Facility: CLINIC | Age: 74
End: 2024-12-16
Payer: MEDICARE

## 2024-12-16 VITALS
WEIGHT: 213 LBS | SYSTOLIC BLOOD PRESSURE: 132 MMHG | BODY MASS INDEX: 31.55 KG/M2 | DIASTOLIC BLOOD PRESSURE: 80 MMHG | HEART RATE: 75 BPM | HEIGHT: 69 IN | TEMPERATURE: 94.8 F | OXYGEN SATURATION: 97 %

## 2024-12-16 DIAGNOSIS — E66.09 CLASS 1 OBESITY DUE TO EXCESS CALORIES WITHOUT SERIOUS COMORBIDITY WITH BODY MASS INDEX (BMI) OF 30.0 TO 30.9 IN ADULT: ICD-10-CM

## 2024-12-16 DIAGNOSIS — E11.3293 TYPE 2 DIABETES MELLITUS WITH BOTH EYES AFFECTED BY MILD NONPROLIFERATIVE RETINOPATHY WITHOUT MACULAR EDEMA, WITHOUT LONG-TERM CURRENT USE OF INSULIN: ICD-10-CM

## 2024-12-16 DIAGNOSIS — N18.31 CHRONIC KIDNEY DISEASE, STAGE 3A (MULTI): ICD-10-CM

## 2024-12-16 DIAGNOSIS — I10 PRIMARY HYPERTENSION: ICD-10-CM

## 2024-12-16 DIAGNOSIS — E11.9 TYPE 2 DIABETES MELLITUS WITHOUT COMPLICATION, WITHOUT LONG-TERM CURRENT USE OF INSULIN (MULTI): ICD-10-CM

## 2024-12-16 DIAGNOSIS — R97.20 ABNORMAL PSA: Primary | ICD-10-CM

## 2024-12-16 DIAGNOSIS — E66.811 CLASS 1 OBESITY DUE TO EXCESS CALORIES WITHOUT SERIOUS COMORBIDITY WITH BODY MASS INDEX (BMI) OF 30.0 TO 30.9 IN ADULT: ICD-10-CM

## 2024-12-16 LAB — POC HEMOGLOBIN A1C: 6.3 % (ref 4.2–6.5)

## 2024-12-16 PROCEDURE — 1157F ADVNC CARE PLAN IN RCRD: CPT | Performed by: FAMILY MEDICINE

## 2024-12-16 PROCEDURE — 3049F LDL-C 100-129 MG/DL: CPT | Performed by: FAMILY MEDICINE

## 2024-12-16 PROCEDURE — 1159F MED LIST DOCD IN RCRD: CPT | Performed by: FAMILY MEDICINE

## 2024-12-16 PROCEDURE — 1160F RVW MEDS BY RX/DR IN RCRD: CPT | Performed by: FAMILY MEDICINE

## 2024-12-16 PROCEDURE — 3079F DIAST BP 80-89 MM HG: CPT | Performed by: FAMILY MEDICINE

## 2024-12-16 PROCEDURE — 83036 HEMOGLOBIN GLYCOSYLATED A1C: CPT | Performed by: FAMILY MEDICINE

## 2024-12-16 PROCEDURE — 4010F ACE/ARB THERAPY RXD/TAKEN: CPT | Performed by: FAMILY MEDICINE

## 2024-12-16 PROCEDURE — 3061F NEG MICROALBUMINURIA REV: CPT | Performed by: FAMILY MEDICINE

## 2024-12-16 PROCEDURE — 1036F TOBACCO NON-USER: CPT | Performed by: FAMILY MEDICINE

## 2024-12-16 PROCEDURE — 3008F BODY MASS INDEX DOCD: CPT | Performed by: FAMILY MEDICINE

## 2024-12-16 PROCEDURE — 99214 OFFICE O/P EST MOD 30 MIN: CPT | Performed by: FAMILY MEDICINE

## 2024-12-16 PROCEDURE — 3075F SYST BP GE 130 - 139MM HG: CPT | Performed by: FAMILY MEDICINE

## 2024-12-16 RX ORDER — BLOOD SUGAR DIAGNOSTIC
1 STRIP MISCELLANEOUS
Qty: 100 STRIP | Refills: 1 | Status: SHIPPED | OUTPATIENT
Start: 2024-12-16

## 2024-12-16 ASSESSMENT — PATIENT HEALTH QUESTIONNAIRE - PHQ9
2. FEELING DOWN, DEPRESSED OR HOPELESS: NOT AT ALL
1. LITTLE INTEREST OR PLEASURE IN DOING THINGS: NOT AT ALL
SUM OF ALL RESPONSES TO PHQ9 QUESTIONS 1 AND 2: 0

## 2024-12-16 NOTE — PROGRESS NOTES
"Subjective   Patient ID: Mario Mendenhall \"Ed\" is a 74 y.o. male who presents for Diabetes.  HPI    DM  Does check glucose at home - yes   Eats a generally healthy diet  Does exercise   Currently taking Farxiga 5 mg daily and Metoprolol 500 mg BID  Last A1c on 9/3/24@6.3%  Today, A1c is 6.3%.  Last eye exam - 3-4 months ago  Does not see a Podiatrist- no   Last BW was on 6/28/24    HTN  She does check her BP at home.   SBP at home remains within 130-140s.  Currently taking metoprolol XL, amlodipine, benazepril.   Medications working well.  No leg swelling.  Cut down on caffeine.  Repeat /76.    No recent Advil/Aleve.            Patient would like to go over recent test, EKG, Holter monitor, and sleep study.   We reviewed them:   - EKG showed sinus rhythm with premature atrial complexes   - 7-day Holter monitor showed frequent PACs (15.2% burden), otherwise no significant arrhythmia   - Home sleep study does not show evidence of obstructive sleep apnea    Patient would like to know alternatives to Farxiga due to how pricey it has become.  It costs him $140 for 30 days.         No other concern/question   Review of systems  ; Patient seen today for exam denies any problems with headaches or vision, denies any shortness of breath chest pain nausea or vomiting, no black stool no blood in the stool no heartburn type symptoms denies any problems with constipation or diarrhea, and no dysuria-type symptoms    The patient's allergies medications were reviewed with them today    The patient's social family and surgical history or also reviewed here today, along with her past medical history.     Objective     Alert and active in  no acute distress  HEENT TMs clear oropharynx negative nares clear no drainage noted neck supple  With no adenopathy   Heart regular rate and rhythm without murmur and no carotid bruits  Lungs- clear to auscultation bilaterally, no wheeze or rhonchi noted  Thyroid -negative masses or " "nodularity  Abdomen- soft times four quadrants , bowel sounds positive no masses or organomegaly, negative tenderness guarding or rebound  Neurological exam unremarkable- DTRs in upper and lower extremities within normal limits.   skin -no lesions noted      /80 (BP Location: Left arm, Patient Position: Sitting, BP Cuff Size: Adult)   Pulse 75   Temp 34.9 °C (94.8 °F) (Temporal)   Ht 1.753 m (5' 9\")   Wt 96.6 kg (213 lb)   SpO2 97%   BMI 31.45 kg/m²     No Known Allergies    Assessment/Plan   Problem List Items Addressed This Visit       Diabetes (Multi)    Relevant Medications    blood sugar diagnostic (OneTouch Ultra Test) strip    Other Relevant Orders    POCT glycosylated hemoglobin (Hb A1C) manually resulted (Completed)    Referral to Clinical Pharmacy    HTN (hypertension)    Relevant Orders    Basic metabolic panel    Class 1 obesity due to excess calories without serious comorbidity with body mass index (BMI) of 30.0 to 30.9 in adult    Chronic kidney disease, stage 3a (Multi)     Other Visit Diagnoses       Abnormal PSA    -  Primary    Relevant Orders    PSA, total and free    BMI 31.0-31.9,adult              Reviewed BW, EKG, Holter monitor, and sleep study.   Ordered labs (BMP, PSA) today.   Refilled OneTouch strip  Continue all the medications at the prescribed dose.      We recommended him to go ahead and get started with clinical pharmacy and provided the referral today. He will follow up with Matthias to receive assistance with the cost of Farxiga.  Best for him to stay on what his medications is that will help his kidneys and also his diabetes    If anything worsens or changes please call us at once, follow up in the office as planned,       Scribe Attestation  By signing my name below, ISandrine, Scribluisito   attest that this documentation has been prepared under the direction and in the presence of Brian Crow DO.  "

## 2024-12-17 DIAGNOSIS — E11.9 TYPE 2 DIABETES MELLITUS WITHOUT COMPLICATION, WITHOUT LONG-TERM CURRENT USE OF INSULIN (MULTI): ICD-10-CM

## 2024-12-17 NOTE — TELEPHONE ENCOUNTER
"Last seen 12/16/24  Medication pended      Mario Mendenhall \"Ed\"  P Do [id number] [facility] Clinical Support Staff (supporting Brian Crow DO)2 minutes ago (2:22 PM)       Sorry, I disposed the box the needles came in. I have taken a snapshot showing the size. I think its 5 MM. Does the last time ordered these have the size in your records? Hope this helps. I have about 2 weeks left in my supply.      Need prescription for - One Touch Ultra needles.     Ed  "

## 2024-12-18 RX ORDER — LANCETS
EACH MISCELLANEOUS
Qty: 100 EACH | Refills: 1 | Status: SHIPPED | OUTPATIENT
Start: 2024-12-18

## 2024-12-31 DIAGNOSIS — E78.2 MIXED HYPERLIPIDEMIA: ICD-10-CM

## 2025-01-02 RX ORDER — ATORVASTATIN CALCIUM 20 MG/1
20 TABLET, FILM COATED ORAL DAILY
Qty: 90 TABLET | Refills: 1 | Status: SHIPPED | OUTPATIENT
Start: 2025-01-02

## 2025-01-03 DIAGNOSIS — E11.3293 TYPE 2 DIABETES MELLITUS WITH BOTH EYES AFFECTED BY MILD NONPROLIFERATIVE RETINOPATHY WITHOUT MACULAR EDEMA, WITHOUT LONG-TERM CURRENT USE OF INSULIN: ICD-10-CM

## 2025-01-03 DIAGNOSIS — E11.9 TYPE 2 DIABETES MELLITUS WITHOUT COMPLICATION, WITHOUT LONG-TERM CURRENT USE OF INSULIN (MULTI): ICD-10-CM

## 2025-01-03 RX ORDER — LANCETS
EACH MISCELLANEOUS
Qty: 200 EACH | Refills: 1 | Status: SHIPPED | OUTPATIENT
Start: 2025-01-03

## 2025-01-03 RX ORDER — BLOOD SUGAR DIAGNOSTIC
STRIP MISCELLANEOUS
Qty: 200 STRIP | Refills: 1 | Status: SHIPPED | OUTPATIENT
Start: 2025-01-03

## 2025-01-10 ENCOUNTER — APPOINTMENT (OUTPATIENT)
Dept: PHARMACY | Facility: HOSPITAL | Age: 75
End: 2025-01-10
Payer: MEDICARE

## 2025-01-10 DIAGNOSIS — E11.3293 TYPE 2 DIABETES MELLITUS WITH BOTH EYES AFFECTED BY MILD NONPROLIFERATIVE RETINOPATHY WITHOUT MACULAR EDEMA, WITHOUT LONG-TERM CURRENT USE OF INSULIN: ICD-10-CM

## 2025-01-10 DIAGNOSIS — E11.9 TYPE 2 DIABETES MELLITUS WITHOUT COMPLICATION, WITHOUT LONG-TERM CURRENT USE OF INSULIN (MULTI): ICD-10-CM

## 2025-01-10 RX ORDER — DAPAGLIFLOZIN 5 MG/1
5 TABLET, FILM COATED ORAL DAILY
Qty: 30 TABLET | Refills: 2 | Status: SHIPPED | OUTPATIENT
Start: 2025-01-10 | End: 2026-01-10

## 2025-01-10 NOTE — ASSESSMENT & PLAN NOTE
Is pt A1c at goal? Yes, 6.3%  Are patient's SMBGs at goal?  Yes,    Well controlled - no changes needed today. Reports that Farxiga is expensive - will pursue  PAP.    Medication Changes:  CONTINUE:  Farxiga 5mg daily  Metformin XR 500mg twice daily      PATIENT EDUCATION/GOALS  Average < 180mg/dL  Time in range > 50%  Fasting BG: <150mg/dL  Postprandial BG: less than 180 mg/dL  A1c: less than 8%      Low and High Blood Sugar  Symptoms of low blood sugar include: Fast heartbeat, shaking, sweating, nervousness or anxiety, irritability or confusion, and/or dizziness.  Symptoms of high blood sugar include: Feeling more thirsty than usual, urinating often, losing weight without trying, presence of ketones in the urine, feeling tired and weak, feeling irritable or having other mood changes, having blurry vision, and/or having slow-healing sores.  If you experience symptoms of low blood sugar (blood sugar less than 70 mg/dL) follow the rule of 15 by eating ~15 g of simple carbohydrates (examples: half cup juice, 3-4 glucose tabs, 1 tablespoon of sugar, honey, or syrup).    Dietary Recommendations  The a lower carbohydrate or Mediterranean diet is often recommended for patients with elevated blood glucose.   Food recommendations:   Focus on whole foods, with as few ingredients as possible.   Focus on lower glycemic foods (GI of 55 or less): this includes most fruits and vegetables, beans, minimally processed grains, dairy, nuts and seeds.  Minimize moderate glycemic index (GI 56 to 69) foods: White and sweet potatoes, corn, white rice, couscous, breakfast cereals such as Cream of Wheat.  Avoid high glycemic index (GI of 70 or higher): White bread, rice cakes, most crackers, bagels, cakes, doughnuts, croissants, most packaged breakfast cereals.  Include 1-2 servings weekly fatty fish that are low in mercury such as salmon, mackerel, anchovies, sardines, and herring. Avoid frying fish. Bake, steam, or poach.   Avoid  trans-fats (fried foods, microwave popcorn, margarine, etc.).   Use oils such as coconut oil, olive oil, avocado oil, or ghee. Select oils appropriate for the temperature you are cooking at.   Avoid processed meats (such as deli meat), canned soups, soy sauce, and fried foods these are all high in added sodium.   The recommended sodium intake for most people is around 2,300 mg/day (too little or too much salt can affect blood pressure).   It is ok to add salt to suit your taste to fresh whole foods (such as vegetables) that you are cooking at home.   Include 4-5 servings daily of both fruits and vegetables. Fruits and vegetables are a good source of fiber, potassium, and magnesium which help support healthy blood pressure.  Focus on eating a variety of colors each day (eating the rainbow- such as red peppers, orange carrots, yellow beans, green lettuce, blue/purple berries, white/brown onions).   Avoid foods with added sugars (goal of <10 grams/serving of added sugar). Limit added sugars to less than 24 (women)-36 (men) grams daily.  Beverage recommendations:    Avoid caffeinated drinks such as coffee, energy drinks, and soda (both regular and diet). Consider sparkling water, water with lemon (or other fruit), or black, oolong, or green tea (prior to noon).   Avoid regular consumption of alcohol. If it is a special occasion the recommended alcohol intake for a male is 2 (men) or 1(women) or less drinks per day.  Alcohol consumption may place people with diabetes at increased risk for delayed hypoglycemia (low blood sugar) especially if taking other medications that may cause hypoglycemia such as insulin.     Lifestyle Recommendations  Avoid tobacco products (including chewing tobacco and vaping).   Continue to integrate regular movement and enjoyable forms of exercise into your weekly routine. The recommended exercise regimen is 150 minutes per week (for example 5 days per week, 30 minutes per day).   Consider  walking for 10-15 minutes after each meal in order to help control blood sugar.   Manage/reduce stress  Consider therapy, mindfulness, breathing exercises (4-7-8 breath), meditation, yoga, journaling, addressing/removing stressors, etc.   Sleep  Goal of 7-9 hours of restful sleep nightly.      Dapagliflozin (Farxiga) Education:  Counseled patient on Dapagliflozin (Farxiga) MOA, expectations, side effects, duration of therapy, administration, and monitoring parameters.  Reviewed the benefits of SGLT-2i therapy, such as glycemic control and kidney and CV protection.  Advised patient to practice proper  hygiene to reduce risk of UTIs or yeast infections.  Advised patient to maintain adequate fluid intake to remain hydrated while on SGLT2i therapy.  Answered all patient questions and concerns.

## 2025-01-10 NOTE — ASSESSMENT & PLAN NOTE
Is pt A1c at goal? Yes, 6.3%  Are patient's SMBGs at goal?  {YES/NO/NA:45510}  ***    Medication Changes:  CONTINUE:    STOP    START    INCREASE    DECREASE        PATIENT EDUCATION/GOALS  Average < 180mg/dL  Time in range > 50%  Fasting BG: <150mg/dL  Postprandial BG: less than 180 mg/dL  A1c: less than 8%      Low and High Blood Sugar  Symptoms of low blood sugar include: Fast heartbeat, shaking, sweating, nervousness or anxiety, irritability or confusion, and/or dizziness.  Symptoms of high blood sugar include: Feeling more thirsty than usual, urinating often, losing weight without trying, presence of ketones in the urine, feeling tired and weak, feeling irritable or having other mood changes, having blurry vision, and/or having slow-healing sores.  If you experience symptoms of low blood sugar (blood sugar less than 70 mg/dL) follow the rule of 15 by eating ~15 g of simple carbohydrates (examples: half cup juice, 3-4 glucose tabs, 1 tablespoon of sugar, honey, or syrup).    Dietary Recommendations  The a lower carbohydrate or Mediterranean diet is often recommended for patients with elevated blood glucose.   Food recommendations:   Focus on whole foods, with as few ingredients as possible.   Focus on lower glycemic foods (GI of 55 or less): this includes most fruits and vegetables, beans, minimally processed grains, dairy, nuts and seeds.  Minimize moderate glycemic index (GI 56 to 69) foods: White and sweet potatoes, corn, white rice, couscous, breakfast cereals such as Cream of Wheat.  Avoid high glycemic index (GI of 70 or higher): White bread, rice cakes, most crackers, bagels, cakes, doughnuts, croissants, most packaged breakfast cereals.  Include 1-2 servings weekly fatty fish that are low in mercury such as salmon, mackerel, anchovies, sardines, and herring. Avoid frying fish. Bake, steam, or poach.   Avoid trans-fats (fried foods, microwave popcorn, margarine, etc.).   Use oils such as coconut oil,  olive oil, avocado oil, or ghee. Select oils appropriate for the temperature you are cooking at.   Avoid processed meats (such as deli meat), canned soups, soy sauce, and fried foods these are all high in added sodium.   The recommended sodium intake for most people is around 2,300 mg/day (too little or too much salt can affect blood pressure).   It is ok to add salt to suit your taste to fresh whole foods (such as vegetables) that you are cooking at home.   Include 4-5 servings daily of both fruits and vegetables. Fruits and vegetables are a good source of fiber, potassium, and magnesium which help support healthy blood pressure.  Focus on eating a variety of colors each day (eating the rainbow- such as red peppers, orange carrots, yellow beans, green lettuce, blue/purple berries, white/brown onions).   Avoid foods with added sugars (goal of <10 grams/serving of added sugar). Limit added sugars to less than 24 (women)-36 (men) grams daily.  Beverage recommendations:    Avoid caffeinated drinks such as coffee, energy drinks, and soda (both regular and diet). Consider sparkling water, water with lemon (or other fruit), or black, oolong, or green tea (prior to noon).   Avoid regular consumption of alcohol. If it is a special occasion the recommended alcohol intake for a male is 2 (men) or 1(women) or less drinks per day.  Alcohol consumption may place people with diabetes at increased risk for delayed hypoglycemia (low blood sugar) especially if taking other medications that may cause hypoglycemia such as insulin.     Lifestyle Recommendations  Avoid tobacco products (including chewing tobacco and vaping).   Continue to integrate regular movement and enjoyable forms of exercise into your weekly routine. The recommended exercise regimen is 150 minutes per week (for example 5 days per week, 30 minutes per day).   Consider walking for 10-15 minutes after each meal in order to help control blood sugar.   Manage/reduce  stress  Consider therapy, mindfulness, breathing exercises (4-7-8 breath), meditation, yoga, journaling, addressing/removing stressors, etc.   Sleep  Goal of 7-9 hours of restful sleep nightly.

## 2025-01-10 NOTE — PROGRESS NOTES
"      Patient ID: Mario Mendenhall \"Ed\" is a 74 y.o. male who presents for Diabetes.  Pt is here for First appointment.     PCP/Referring Provider: Brian Crow DO  Last Visit: 12.16.24    Verbal consent to manage patient's drug therapy was obtained from patient. They were informed they may decline to participate or withdraw from participation in pharmacy services at any time.      Subjective   Treatment Adherence:   Patient did take medications today.   Number of missed doses in last 7 days: 0  Can patient afford prescribed medications: No, Farxiga expensive    Preferred pharmacy:     [id number] [place], [place] - [address] [facility]  [address] [facility] [address]  Phone: [phone] Fax: [phone]    [facility]  [address], [address]  [facility] [address]  Phone: [phone] Fax: [phone]      \[facility] [place] [facility]\""  DIABETES MELLITUS TYPE 2:    Known diabetic complications: none.  Does patient follow with Endocrinology?: no      Current diabetic medications include:  Farxiga 5mg daily  Metformin XR 500mg twice daily    Clarifications to above regimen: none  Adverse Effects: none reported     Home blood glucose records (mg/dL)  FBG: <130s    Any episodes of hypoglycemia? No, denies .    Did patient treat episode of hypoglycemia appropriately? N/A  Does the patient have a prescription for ready-to-use Glucagon? Not on insulin     Does pt have proteinuria? No   If appropriate, is patient on ACEi/ARB? N/A    Secondary Prevention  Statin? Yes  ACE-I/ARB? Yes  Aspirin? Yes    Pertinent PMH Review:  PMH of Pancreatitis: No  PMH of Retinopathy: No  PMH of Urinary Tract Infections: No  PMH of MTC: No    Review of Systems    Objective     BP Readings from Last 4 Encounters:   12/16/24 132/80   09/27/24 144/85   08/14/24 126/82   07/10/24 115/83      There were no vitals filed for this visit.     Labs  Creatinine   Date Value Ref Range Status   06/28/2024 1.54 (H) 0.50 - 1.30 " mg/dL Final     eGFR   Date Value Ref Range Status   06/28/2024 47 (L) >60 mL/min/1.73m*2 Final     Comment:     Calculations of estimated GFR are performed using the 2021 CKD-EPI Study Refit equation without the race variable for the IDMS-Traceable creatinine methods.  https://jasn.asnjournals.org/content/early/2021/09/22/ASN.2401485118     Sodium   Date Value Ref Range Status   06/28/2024 139 136 - 145 mmol/L Final     Potassium   Date Value Ref Range Status   06/28/2024 4.8 3.5 - 5.3 mmol/L Final     Chloride   Date Value Ref Range Status   06/28/2024 105 98 - 107 mmol/L Final     Urea Nitrogen   Date Value Ref Range Status   06/28/2024 28 (H) 6 - 23 mg/dL Final     AST   Date Value Ref Range Status   06/28/2024 19 9 - 39 U/L Final        Lab Results   Component Value Date    BILITOT 0.9 06/28/2024    CALCIUM 9.4 06/28/2024    CO2 26 06/28/2024     06/28/2024    CREATININE 1.54 (H) 06/28/2024    GLUCOSE 108 (H) 06/28/2024    ALKPHOS 58 06/28/2024    K 4.8 06/28/2024    PROT 7.1 06/28/2024     06/28/2024    AST 19 06/28/2024    ALT 19 06/28/2024    BUN 28 (H) 06/28/2024    ANIONGAP 13 06/28/2024    ALBUMIN 4.5 06/28/2024    GFRMALE 51 (A) 06/22/2023     Lab Results   Component Value Date    TRIG 123 06/28/2024    CHOL 194 06/28/2024    LDLCALC 101 (H) 06/28/2024    HDL 68.7 06/28/2024     Lab Results   Component Value Date    HGBA1C 6.3 12/16/2024       Current Outpatient Medications   Medication Instructions    amLODIPine (NORVASC) 10 mg, oral, Daily    aspirin 81 mg    atorvastatin (LIPITOR) 20 mg, oral, Daily    benazepril (Lotensin) 20 mg tablet TAKE ONE TABLET BY MOUTH TWO TIMES A DAY    blood sugar diagnostic (OneTouch Ultra Test) strip Use to test sugars twice daily.    dapagliflozin propanediol (FARXIGA) 5 mg, oral, Daily    garlic 200 mg tablet 2 tablets, Daily    lancets misc Use to test sugars twice daily.    metFORMIN XR (GLUCOPHAGE-XR) 500 mg, oral, 2 times daily (morning and late  afternoon), Do not crush, chew, or split.    metoprolol succinate XL (TOPROL-XL) 50 mg, oral, Daily, Do not crush or chew.    multivitamin tablet 1 tablet, Daily    omega 3-dha-epa-fish oil 1,200 (144-216) mg capsule Take by mouth.    timolol (Timoptic) 0.5 % ophthalmic solution instill 1 drop into both eyes every morning        DRUG INTERACTIONS:  None at time of review    Assessment/Plan   Problem List Items Addressed This Visit             ICD-10-CM    Diabetes (Multi) E11.9     Is pt A1c at goal? Yes, 6.3%  Are patient's SMBGs at goal?  Yes,    Well controlled - no changes needed today. Reports that Farxiga is expensive - will pursue  PAP.    Medication Changes:  CONTINUE:  Farxiga 5mg daily  Metformin XR 500mg twice daily      PATIENT EDUCATION/GOALS  Average < 180mg/dL  Time in range > 50%  Fasting BG: <150mg/dL  Postprandial BG: less than 180 mg/dL  A1c: less than 8%      Low and High Blood Sugar  Symptoms of low blood sugar include: Fast heartbeat, shaking, sweating, nervousness or anxiety, irritability or confusion, and/or dizziness.  Symptoms of high blood sugar include: Feeling more thirsty than usual, urinating often, losing weight without trying, presence of ketones in the urine, feeling tired and weak, feeling irritable or having other mood changes, having blurry vision, and/or having slow-healing sores.  If you experience symptoms of low blood sugar (blood sugar less than 70 mg/dL) follow the rule of 15 by eating ~15 g of simple carbohydrates (examples: half cup juice, 3-4 glucose tabs, 1 tablespoon of sugar, honey, or syrup).    Dietary Recommendations  The a lower carbohydrate or Mediterranean diet is often recommended for patients with elevated blood glucose.   Food recommendations:   Focus on whole foods, with as few ingredients as possible.   Focus on lower glycemic foods (GI of 55 or less): this includes most fruits and vegetables, beans, minimally processed grains, dairy, nuts and  seeds.  Minimize moderate glycemic index (GI 56 to 69) foods: White and sweet potatoes, corn, white rice, couscous, breakfast cereals such as Cream of Wheat.  Avoid high glycemic index (GI of 70 or higher): White bread, rice cakes, most crackers, bagels, cakes, doughnuts, croissants, most packaged breakfast cereals.  Include 1-2 servings weekly fatty fish that are low in mercury such as salmon, mackerel, anchovies, sardines, and herring. Avoid frying fish. Bake, steam, or poach.   Avoid trans-fats (fried foods, microwave popcorn, margarine, etc.).   Use oils such as coconut oil, olive oil, avocado oil, or ghee. Select oils appropriate for the temperature you are cooking at.   Avoid processed meats (such as deli meat), canned soups, soy sauce, and fried foods these are all high in added sodium.   The recommended sodium intake for most people is around 2,300 mg/day (too little or too much salt can affect blood pressure).   It is ok to add salt to suit your taste to fresh whole foods (such as vegetables) that you are cooking at home.   Include 4-5 servings daily of both fruits and vegetables. Fruits and vegetables are a good source of fiber, potassium, and magnesium which help support healthy blood pressure.  Focus on eating a variety of colors each day (eating the rainbow- such as red peppers, orange carrots, yellow beans, green lettuce, blue/purple berries, white/brown onions).   Avoid foods with added sugars (goal of <10 grams/serving of added sugar). Limit added sugars to less than 24 (women)-36 (men) grams daily.  Beverage recommendations:    Avoid caffeinated drinks such as coffee, energy drinks, and soda (both regular and diet). Consider sparkling water, water with lemon (or other fruit), or black, oolong, or green tea (prior to noon).   Avoid regular consumption of alcohol. If it is a special occasion the recommended alcohol intake for a male is 2 (men) or 1(women) or less drinks per day.  Alcohol consumption  may place people with diabetes at increased risk for delayed hypoglycemia (low blood sugar) especially if taking other medications that may cause hypoglycemia such as insulin.     Lifestyle Recommendations  Avoid tobacco products (including chewing tobacco and vaping).   Continue to integrate regular movement and enjoyable forms of exercise into your weekly routine. The recommended exercise regimen is 150 minutes per week (for example 5 days per week, 30 minutes per day).   Consider walking for 10-15 minutes after each meal in order to help control blood sugar.   Manage/reduce stress  Consider therapy, mindfulness, breathing exercises (4-7-8 breath), meditation, yoga, journaling, addressing/removing stressors, etc.   Sleep  Goal of 7-9 hours of restful sleep nightly.      Dapagliflozin (Farxiga) Education:  Counseled patient on Dapagliflozin (Farxiga) MOA, expectations, side effects, duration of therapy, administration, and monitoring parameters.  Reviewed the benefits of SGLT-2i therapy, such as glycemic control and kidney and CV protection.  Advised patient to practice proper  hygiene to reduce risk of UTIs or yeast infections.  Advised patient to maintain adequate fluid intake to remain hydrated while on SGLT2i therapy.  Answered all patient questions and concerns.         Relevant Medications    dapagliflozin propanediol (Farxiga) 5 mg    Other Relevant Orders    Referral to Clinical Pharmacy       Immunizations needed:  RSV recommended    Labs ordered:  none     Referrals:  none     Follow-up: 3 weeks     Patient was provided with PharmD phone number and encouraged to reach out with any questions or concerns Prior to next appointment or ask provider for another pharmacy referral.    Time spent with pt: Total length of time 10 (minutes) of the encounter and more than 50% was spent counseling the patient.    Thank you for allowing to take part in the care of this patient.    Halina Mcdaniels, LuiD,  TANYA  Clinical Pharmacist  [phone]    Continue all meds under the continuation of care with the referring provider and clinical pharmacy team.    Verbal consent to manage patient's drug therapy was obtained from the patient. They were informed they may decline to participate or withdraw from participation in pharmacy services at any time.

## 2025-01-22 ENCOUNTER — APPOINTMENT (OUTPATIENT)
Dept: CARDIOLOGY | Facility: CLINIC | Age: 75
End: 2025-01-22
Payer: MEDICARE

## 2025-01-22 VITALS
WEIGHT: 214 LBS | HEIGHT: 69 IN | HEART RATE: 89 BPM | BODY MASS INDEX: 31.7 KG/M2 | SYSTOLIC BLOOD PRESSURE: 183 MMHG | DIASTOLIC BLOOD PRESSURE: 100 MMHG

## 2025-01-22 DIAGNOSIS — I49.1 PAC (PREMATURE ATRIAL CONTRACTION): Primary | ICD-10-CM

## 2025-01-22 DIAGNOSIS — I10 PRIMARY HYPERTENSION: ICD-10-CM

## 2025-01-22 DIAGNOSIS — R93.1 AGATSTON CAC SCORE 100-199: ICD-10-CM

## 2025-01-22 DIAGNOSIS — E78.49 OTHER HYPERLIPIDEMIA: ICD-10-CM

## 2025-01-22 PROCEDURE — 4010F ACE/ARB THERAPY RXD/TAKEN: CPT | Performed by: INTERNAL MEDICINE

## 2025-01-22 PROCEDURE — 3008F BODY MASS INDEX DOCD: CPT | Performed by: INTERNAL MEDICINE

## 2025-01-22 PROCEDURE — 3080F DIAST BP >= 90 MM HG: CPT | Performed by: INTERNAL MEDICINE

## 2025-01-22 PROCEDURE — 3077F SYST BP >= 140 MM HG: CPT | Performed by: INTERNAL MEDICINE

## 2025-01-22 PROCEDURE — 1157F ADVNC CARE PLAN IN RCRD: CPT | Performed by: INTERNAL MEDICINE

## 2025-01-22 PROCEDURE — 99214 OFFICE O/P EST MOD 30 MIN: CPT | Performed by: INTERNAL MEDICINE

## 2025-01-22 RX ORDER — METOPROLOL SUCCINATE 50 MG/1
75 TABLET, EXTENDED RELEASE ORAL DAILY
Qty: 135 TABLET | Refills: 3 | Status: SHIPPED | OUTPATIENT
Start: 2025-01-22 | End: 2026-01-22

## 2025-01-22 NOTE — PROGRESS NOTES
Patient:  Mario Mendenhall  YOB: 1950  MRN: [id number]       HPI:       Mario Mendenhall is a 74 y.o. male who returns today for cardiac follow-up.  He was initially seen on July 10, 2024 for evaluation of PVCs.  He does not have any history of atherosclerotic heart or valvular heart disease.  He has a history of primary hypertension, hyperlipidemia, and type 2 diabetes mellitus.  He had a coronary CT calcium score November 3, 2022 that was 161.  This places him into an intermediate risk category for future coronary events.  He was incidentally noted to have mild enlargement of the ascending thoracic aorta measuring 3.8 cm.  He is maintained on aspirin, amlodipine, benazepril, Toprol-XL, and atorvastatin.      He was noted on a recent office visit to have modest frequency of PVCs at rest.  He described occasional skipped beats.  He was otherwise feeling well.  He swims on a regular basis.  An exercise treadmill test on August 7, 2024 was negative for any exercise-induced chest discomfort or ST changes.  He achieved 102% of MPHR at an energy level of 8.5 METS.  Mildly excessive chronotropic and blood pressure response.  Peak blood pressure 180/90 mmHg.  Frequent PACs were noted.    A 24-hour Holter monitor on August 19, 2024 showed normal sinus rhythm with an average heart rate 76 bpm.  Heart rates ranged from 56 to 110 bpm.  There were frequent PACs comprising 11% of the overall rhythm.  Toprol-XL was increased to 50 mg daily.  An echocardiogram September 12, 2024 showed an estimated LV ejection fraction of 60 to 65%.  There was mild mitral regurgitation and trivial to 1+ tricuspid regurgitation.  An EKG on September 27, 2024 showed normal sinus rhythm with PACs.    He was seen in consultation by Dr. Blanca Garcia on September 27, 2024. He recommended conservative care and continuation of Toprol-XL 50 mg daily.  He had some symptoms suggestive of underlying sleep apnea.  A home sleep test on December  16, 2024 did not show any significant sleep-related breathing disorder.  Oxygen saturation kailey was 87%.  A follow-up 7-day Zio patch monitor November 1, 2024 showed normal sinus rhythm with frequent PACs comprising 15% of the overall rhythm.  Rare PVCs comprising less than 1% of the rhythm.  No atrial fibrillation or flutter.  There were 4 short burst of SVT, the longest being 6 beats.     He has been doing well since his last visit.  He denies any chest pain or shortness of breath.  He denies any orthopnea, PND, or increasing peripheral edema.  He denies any palpitations, lightheadedness, near-syncope, or syncope.  He denies any fever, chills, or cough.  He denies any nausea, vomiting, or diaphoresis.  He denies any hemoptysis, hematemesis, melena, or hematochezia.  Lab studies June 28, 2024 showed a normal CBC with exception of RBC 4.40.  Comprehensive metabolic profile was normal with exception of glucose 108, BUN 28, and creatinine 1.54.  Cholesterol 194 with HDL 68, , and triglycerides 123.  Hemoglobin A1c on December 16, 2024 was 6.3.  His blood pressures are predominately running in the 130s to 140s.  I advised him to increase the Toprol-XL to 75 mg daily.  He is already on a maximal dose of amlodipine and benazepril.  He will monitor his blood pressures closely at home and notify us if they remain elevated.  Other details as noted below.     The above portion of this note was dictated by me using voice recognition software.  I personally performed the services described in the documentation.  The scribe entering the documentation below was in my presence.  I affirm that the information is both accurate and complete.      Objective:     Vitals:    01/22/25 1418   BP: (!) 183/100   Pulse: 89       Wt Readings from Last 4 Encounters:   12/16/24 96.6 kg (213 lb)   09/27/24 95.7 kg (211 lb)   08/14/24 94.8 kg (209 lb)   07/10/24 94.8 kg (209 lb)       Allergies:     No Known Allergies       Medications:      Current Outpatient Medications   Medication Instructions    amLODIPine (NORVASC) 10 mg, oral, Daily    aspirin 81 mg    atorvastatin (LIPITOR) 20 mg, oral, Daily    benazepril (Lotensin) 20 mg tablet TAKE ONE TABLET BY MOUTH TWO TIMES A DAY    blood sugar diagnostic (OneTouch Ultra Test) strip Use to test sugars twice daily.    dapagliflozin propanediol (FARXIGA) 5 mg, oral, Daily    garlic 200 mg tablet 2 tablets, Daily    lancets misc Use to test sugars twice daily.    metFORMIN XR (GLUCOPHAGE-XR) 500 mg, oral, 2 times daily (morning and late afternoon), Do not crush, chew, or split.    metoprolol succinate XL (TOPROL-XL) 50 mg, oral, Daily, Do not crush or chew.    multivitamin tablet 1 tablet, Daily    omega 3-dha-epa-fish oil 1,200 (144-216) mg capsule Take by mouth.    timolol (Timoptic) 0.5 % ophthalmic solution instill 1 drop into both eyes every morning       Physical Examination:   GENERAL:  Well developed, well nourished, in no acute distress.  CHEST:  Symmetric and nontender.  NEURO/PSYCH:  Alert and oriented times three with approppriate behavior and responses.  NECK:  Supple, no JVD, no bruit.  LUNGS:  Clear to auscultation bilaterally, normal respiratory effort.  HEART:  Rate and rhythm regular with no evident murmur, no gallop appreciated.        There are no rubs, clicks or heaves.  EXTREMITIES:  Warm with good color, no clubbing or cyanosis.  There is no edema noted.  PERIPHERAL VASCULAR:  Pulses present and equally palpable; 2+ throughout.      Lab:     CBC:   Lab Results   Component Value Date    WBC 6.6 06/28/2024    RBC 4.40 (L) 06/28/2024    HGB 13.8 06/28/2024    HCT 41.3 06/28/2024     06/28/2024        CMP:    Lab Results   Component Value Date     06/28/2024    K 4.8 06/28/2024     06/28/2024    CO2 26 06/28/2024    BUN 28 (H) 06/28/2024    CREATININE 1.54 (H) 06/28/2024    GLUCOSE 108 (H) 06/28/2024    CALCIUM 9.4 06/28/2024       Lipid Profile:    Lab Results    Component Value Date    TRIG 123 06/28/2024    HDL 68.7 06/28/2024    LDLCALC 101 (H) 06/28/2024       BMP:  Lab Results   Component Value Date     06/28/2024     06/22/2023     02/11/2022     03/26/2021    K 4.8 06/28/2024    K 5.0 06/22/2023    K 4.3 02/11/2022    K 4.6 03/26/2021     06/28/2024     06/22/2023     02/11/2022     03/26/2021    CO2 26 06/28/2024    CO2 25 06/22/2023    CO2 30 02/11/2022    CO2 29 03/26/2021    BUN 28 (H) 06/28/2024    BUN 23 06/22/2023    BUN 14 02/11/2022    BUN 16 03/26/2021    CREATININE 1.54 (H) 06/28/2024    CREATININE 1.45 (H) 06/22/2023    CREATININE 1.37 (H) 02/11/2022    CREATININE 1.29 03/26/2021       CBC:  Lab Results   Component Value Date    WBC 6.6 06/28/2024    WBC 7.9 06/22/2023    WBC 7.5 02/11/2022    WBC 6.5 03/26/2021    RBC 4.40 (L) 06/28/2024    RBC 4.08 (L) 06/22/2023    RBC 4.13 (L) 02/11/2022    RBC 4.19 (L) 03/26/2021    HGB 13.8 06/28/2024    HGB 12.8 (L) 06/22/2023    HGB 12.8 (L) 02/11/2022    HGB 13.1 (L) 03/26/2021    HCT 41.3 06/28/2024    HCT 36.7 (L) 06/22/2023    HCT 39.3 (L) 02/11/2022    HCT 39.8 (L) 03/26/2021    MCV 94 06/28/2024    MCV 90 06/22/2023    MCV 95 02/11/2022    MCV 95 03/26/2021    MCH 31.4 06/28/2024    MCHC 33.4 06/28/2024    MCHC 34.9 06/22/2023    MCHC 32.6 02/11/2022    MCHC 32.9 03/26/2021    RDW 13.2 06/28/2024    RDW 12.9 06/22/2023    RDW 12.6 02/11/2022    RDW 13.1 03/26/2021     06/28/2024     06/22/2023     02/11/2022     03/26/2021       Hepatic Function Panel:    Lab Results   Component Value Date    ALKPHOS 58 06/28/2024    ALT 19 06/28/2024    AST 19 06/28/2024    PROT 7.1 06/28/2024    BILITOT 0.9 06/28/2024       HgBA1c:    Lab Results   Component Value Date    HGBA1C 6.3 12/16/2024       Problem List:     Patient Active Problem List   Diagnosis    Diabetes (Multi)    Elevated PSA    Glaucoma (increased eye pressure)    HTN  (hypertension)    Hyperlipidemia    Traumatic avulsion of nail plate of toe    Pain of right great toe    Age-related nuclear cataract of both eyes    Cortical age-related cataract of both eyes    Astigmatism of both eyes    Benign prostatic hyperplasia    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    Erectile dysfunction    Myopia, bilateral    Presbyopia    Vitreous degeneration of both eyes    Type 2 diabetes mellitus with mild nonproliferative retinopathy of both eyes without macular edema    Essential hypertension    BMI 30.0-30.9,adult    Class 1 obesity due to excess calories without serious comorbidity with body mass index (BMI) of 30.0 to 30.9 in adult    Medicare annual wellness visit, subsequent    Agatston CAC score 100-199    Symptomatic PVCs    Chronic kidney disease, stage 3a (Multi)       Asessment:     Problem List Items Addressed This Visit             ICD-10-CM    HTN (hypertension) I10    Relevant Medications    metoprolol succinate XL (Toprol-XL) 50 mg 24 hr tablet    Other Relevant Orders    Follow Up In Cardiology    Hyperlipidemia E78.5    Body mass index (BMI) 31.0-31.9, adult Z68.31    Relevant Orders    Follow Up In Cardiology    Agatston CAC score 100-199 R93.1    Relevant Medications    metoprolol succinate XL (Toprol-XL) 50 mg 24 hr tablet    PAC (premature atrial contraction) - Primary I49.1    Relevant Medications    metoprolol succinate XL (Toprol-XL) 50 mg 24 hr tablet

## 2025-01-22 NOTE — PATIENT INSTRUCTIONS
PLEASE BRING ALL MEDICATION BOTTLES TO OFFICE VISITS     INCREASE: METOPROLOL SUCCINATE 50 MG- TAKE 1 1/2 TABLETS DAILY

## 2025-01-31 ENCOUNTER — APPOINTMENT (OUTPATIENT)
Dept: PHARMACY | Facility: HOSPITAL | Age: 75
End: 2025-01-31
Payer: MEDICARE

## 2025-01-31 DIAGNOSIS — E11.3293 TYPE 2 DIABETES MELLITUS WITH BOTH EYES AFFECTED BY MILD NONPROLIFERATIVE RETINOPATHY WITHOUT MACULAR EDEMA, WITHOUT LONG-TERM CURRENT USE OF INSULIN: ICD-10-CM

## 2025-01-31 NOTE — PROGRESS NOTES
"      Patient ID: Mario Mendenhall \"Ed\" is a 74 y.o. male who presents for Diabetes.  Pt is here for Follow Up appointment.     PCP/Referring Provider: Brian Crow DO  Last Visit: 12.16.24    Verbal consent to manage patient's drug therapy was obtained from patient. They were informed they may decline to participate or withdraw from participation in pharmacy services at any time.     assistance for Farxiga approved. First shipment sent 1/22 to pt's home.    Subjective   Treatment Adherence:   Patient did take medications today.   Number of missed doses in last 7 days: 0  Can patient afford prescribed medications: No, Farxiga expensive    Preferred pharmacy:     [id number] [address]  [address] [facility] [address]  Phone: [phone] Fax: [phone]    [facility]  [address], [address]  [facility] [address]  Phone: [phone] Fax: [phone]      [facility]  DIABETES MELLITUS TYPE 2:    Known diabetic complications: none.  Does patient follow with Endocrinology?: no      Current diabetic medications include:  Farxiga 5mg daily  Metformin XR 500mg twice daily    Clarifications to above regimen: none  Adverse Effects: none reported     Home blood glucose records (mg/dL)  FBG: <130s    Any episodes of hypoglycemia? No, denies .    Did patient treat episode of hypoglycemia appropriately? N/A  Does the patient have a prescription for ready-to-use Glucagon? Not on insulin     Does pt have proteinuria? No   If appropriate, is patient on ACEi/ARB? N/A    Secondary Prevention  Statin? Yes  ACE-I/ARB? Yes  Aspirin? Yes    Pertinent PMH Review:  PMH of Pancreatitis: No  PMH of Retinopathy: No  PMH of Urinary Tract Infections: No  PMH of MTC: No    Review of Systems    Objective     BP Readings from Last 4 Encounters:   01/22/25 (!) 183/100   12/16/24 132/80   09/27/24 144/85   08/14/24 126/82      There were no vitals filed for this " visit.     Labs  Creatinine   Date Value Ref Range Status   06/28/2024 1.54 (H) 0.50 - 1.30 mg/dL Final     eGFR   Date Value Ref Range Status   06/28/2024 47 (L) >60 mL/min/1.73m*2 Final     Comment:     Calculations of estimated GFR are performed using the 2021 CKD-EPI Study Refit equation without the race variable for the IDMS-Traceable creatinine methods.  https://jasn.asnjournals.org/content/early/2021/09/22/ASN.6571095118     Sodium   Date Value Ref Range Status   06/28/2024 139 136 - 145 mmol/L Final     Potassium   Date Value Ref Range Status   06/28/2024 4.8 3.5 - 5.3 mmol/L Final     Chloride   Date Value Ref Range Status   06/28/2024 105 98 - 107 mmol/L Final     Urea Nitrogen   Date Value Ref Range Status   06/28/2024 28 (H) 6 - 23 mg/dL Final     AST   Date Value Ref Range Status   06/28/2024 19 9 - 39 U/L Final        Lab Results   Component Value Date    BILITOT 0.9 06/28/2024    CALCIUM 9.4 06/28/2024    CO2 26 06/28/2024     06/28/2024    CREATININE 1.54 (H) 06/28/2024    GLUCOSE 108 (H) 06/28/2024    ALKPHOS 58 06/28/2024    K 4.8 06/28/2024    PROT 7.1 06/28/2024     06/28/2024    AST 19 06/28/2024    ALT 19 06/28/2024    BUN 28 (H) 06/28/2024    ANIONGAP 13 06/28/2024    ALBUMIN 4.5 06/28/2024    GFRMALE 51 (A) 06/22/2023     Lab Results   Component Value Date    TRIG 123 06/28/2024    CHOL 194 06/28/2024    LDLCALC 101 (H) 06/28/2024    HDL 68.7 06/28/2024     Lab Results   Component Value Date    HGBA1C 6.3 12/16/2024       Current Outpatient Medications   Medication Instructions    amLODIPine (NORVASC) 10 mg, oral, Daily    aspirin 81 mg    atorvastatin (LIPITOR) 20 mg, oral, Daily    benazepril (Lotensin) 20 mg tablet TAKE ONE TABLET BY MOUTH TWO TIMES A DAY    blood sugar diagnostic (OneTouch Ultra Test) strip Use to test sugars twice daily.    dapagliflozin propanediol (FARXIGA) 5 mg, oral, Daily    garlic 200 mg tablet 2 tablets, Daily    lancets misc Use to test sugars twice  daily.    metFORMIN XR (GLUCOPHAGE-XR) 500 mg, oral, 2 times daily (morning and late afternoon), Do not crush, chew, or split.    metoprolol succinate XL (TOPROL-XL) 75 mg, oral, Daily    multivitamin tablet 1 tablet, Daily    omega 3-dha-epa-fish oil 1,200 (144-216) mg capsule Take by mouth.    timolol (Timoptic) 0.5 % ophthalmic solution instill 1 drop into both eyes every morning        DRUG INTERACTIONS:  None at time of review    Assessment/Plan   Problem List Items Addressed This Visit             ICD-10-CM    Diabetes (Multi) E11.9     Is pt A1c at goal? Yes, 6.3%  Are patient's SMBGs at goal?  Yes,    Well controlled - no changes needed today. Approved for MFG assistance for Farxiga - waiting for shipment.    Medication Changes:  CONTINUE:  Farxiga 5mg daily  Metformin XR 500mg twice daily      PATIENT EDUCATION/GOALS  Average < 180mg/dL  Time in range > 50%  Fasting BG: <150mg/dL  Postprandial BG: less than 180 mg/dL  A1c: less than 8%      Low and High Blood Sugar  Symptoms of low blood sugar include: Fast heartbeat, shaking, sweating, nervousness or anxiety, irritability or confusion, and/or dizziness.  Symptoms of high blood sugar include: Feeling more thirsty than usual, urinating often, losing weight without trying, presence of ketones in the urine, feeling tired and weak, feeling irritable or having other mood changes, having blurry vision, and/or having slow-healing sores.  If you experience symptoms of low blood sugar (blood sugar less than 70 mg/dL) follow the rule of 15 by eating ~15 g of simple carbohydrates (examples: half cup juice, 3-4 glucose tabs, 1 tablespoon of sugar, honey, or syrup).    Dietary Recommendations  The a lower carbohydrate or Mediterranean diet is often recommended for patients with elevated blood glucose.   Food recommendations:   Focus on whole foods, with as few ingredients as possible.   Focus on lower glycemic foods (GI of 55 or less): this includes most fruits and  vegetables, beans, minimally processed grains, dairy, nuts and seeds.  Minimize moderate glycemic index (GI 56 to 69) foods: White and sweet potatoes, corn, white rice, couscous, breakfast cereals such as Cream of Wheat.  Avoid high glycemic index (GI of 70 or higher): White bread, rice cakes, most crackers, bagels, cakes, doughnuts, croissants, most packaged breakfast cereals.  Include 1-2 servings weekly fatty fish that are low in mercury such as salmon, mackerel, anchovies, sardines, and herring. Avoid frying fish. Bake, steam, or poach.   Avoid trans-fats (fried foods, microwave popcorn, margarine, etc.).   Use oils such as coconut oil, olive oil, avocado oil, or ghee. Select oils appropriate for the temperature you are cooking at.   Avoid processed meats (such as deli meat), canned soups, soy sauce, and fried foods these are all high in added sodium.   The recommended sodium intake for most people is around 2,300 mg/day (too little or too much salt can affect blood pressure).   It is ok to add salt to suit your taste to fresh whole foods (such as vegetables) that you are cooking at home.   Include 4-5 servings daily of both fruits and vegetables. Fruits and vegetables are a good source of fiber, potassium, and magnesium which help support healthy blood pressure.  Focus on eating a variety of colors each day (eating the rainbow- such as red peppers, orange carrots, yellow beans, green lettuce, blue/purple berries, white/brown onions).   Avoid foods with added sugars (goal of <10 grams/serving of added sugar). Limit added sugars to less than 24 (women)-36 (men) grams daily.  Beverage recommendations:    Avoid caffeinated drinks such as coffee, energy drinks, and soda (both regular and diet). Consider sparkling water, water with lemon (or other fruit), or black, oolong, or green tea (prior to noon).   Avoid regular consumption of alcohol. If it is a special occasion the recommended alcohol intake for a male is 2  (men) or 1(women) or less drinks per day.  Alcohol consumption may place people with diabetes at increased risk for delayed hypoglycemia (low blood sugar) especially if taking other medications that may cause hypoglycemia such as insulin.     Lifestyle Recommendations  Avoid tobacco products (including chewing tobacco and vaping).   Continue to integrate regular movement and enjoyable forms of exercise into your weekly routine. The recommended exercise regimen is 150 minutes per week (for example 5 days per week, 30 minutes per day).   Consider walking for 10-15 minutes after each meal in order to help control blood sugar.   Manage/reduce stress  Consider therapy, mindfulness, breathing exercises (4-7-8 breath), meditation, yoga, journaling, addressing/removing stressors, etc.   Sleep  Goal of 7-9 hours of restful sleep nightly.      Dapagliflozin (Farxiga) Education:  Counseled patient on Dapagliflozin (Farxiga) MOA, expectations, side effects, duration of therapy, administration, and monitoring parameters.  Reviewed the benefits of SGLT-2i therapy, such as glycemic control and kidney and CV protection.  Advised patient to practice proper  hygiene to reduce risk of UTIs or yeast infections.  Advised patient to maintain adequate fluid intake to remain hydrated while on SGLT2i therapy.  Answered all patient questions and concerns.         Relevant Orders    Referral to Clinical Pharmacy     Immunizations needed:  RSV recommended    Labs ordered:  none     Referrals:  none     Follow-up: 2 months     Patient was provided with PharmD phone number and encouraged to reach out with any questions or concerns Prior to next appointment or ask provider for another pharmacy referral.    Time spent with pt: Total length of time 10 (minutes) of the encounter and more than 50% was spent counseling the patient.    Thank you for allowing to take part in the care of this patient.    Halina Mcdaniels, PharmD, TANYA  Clinical  Pharmacist  [phone]    Continue all meds under the continuation of care with the referring provider and clinical pharmacy team.    Verbal consent to manage patient's drug therapy was obtained from the patient. They were informed they may decline to participate or withdraw from participation in pharmacy services at any time.

## 2025-01-31 NOTE — ASSESSMENT & PLAN NOTE
Is pt A1c at goal? Yes, 6.3%  Are patient's SMBGs at goal?  Yes,    Well controlled - no changes needed today. Approved for MFG assistance for Farxiga - waiting for shipment.    Medication Changes:  CONTINUE:  Farxiga 5mg daily  Metformin XR 500mg twice daily      PATIENT EDUCATION/GOALS  Average < 180mg/dL  Time in range > 50%  Fasting BG: <150mg/dL  Postprandial BG: less than 180 mg/dL  A1c: less than 8%      Low and High Blood Sugar  Symptoms of low blood sugar include: Fast heartbeat, shaking, sweating, nervousness or anxiety, irritability or confusion, and/or dizziness.  Symptoms of high blood sugar include: Feeling more thirsty than usual, urinating often, losing weight without trying, presence of ketones in the urine, feeling tired and weak, feeling irritable or having other mood changes, having blurry vision, and/or having slow-healing sores.  If you experience symptoms of low blood sugar (blood sugar less than 70 mg/dL) follow the rule of 15 by eating ~15 g of simple carbohydrates (examples: half cup juice, 3-4 glucose tabs, 1 tablespoon of sugar, honey, or syrup).    Dietary Recommendations  The a lower carbohydrate or Mediterranean diet is often recommended for patients with elevated blood glucose.   Food recommendations:   Focus on whole foods, with as few ingredients as possible.   Focus on lower glycemic foods (GI of 55 or less): this includes most fruits and vegetables, beans, minimally processed grains, dairy, nuts and seeds.  Minimize moderate glycemic index (GI 56 to 69) foods: White and sweet potatoes, corn, white rice, couscous, breakfast cereals such as Cream of Wheat.  Avoid high glycemic index (GI of 70 or higher): White bread, rice cakes, most crackers, bagels, cakes, doughnuts, croissants, most packaged breakfast cereals.  Include 1-2 servings weekly fatty fish that are low in mercury such as salmon, mackerel, anchovies, sardines, and herring. Avoid frying fish. Bake, steam, or poach.    Avoid trans-fats (fried foods, microwave popcorn, margarine, etc.).   Use oils such as coconut oil, olive oil, avocado oil, or ghee. Select oils appropriate for the temperature you are cooking at.   Avoid processed meats (such as deli meat), canned soups, soy sauce, and fried foods these are all high in added sodium.   The recommended sodium intake for most people is around 2,300 mg/day (too little or too much salt can affect blood pressure).   It is ok to add salt to suit your taste to fresh whole foods (such as vegetables) that you are cooking at home.   Include 4-5 servings daily of both fruits and vegetables. Fruits and vegetables are a good source of fiber, potassium, and magnesium which help support healthy blood pressure.  Focus on eating a variety of colors each day (eating the rainbow- such as red peppers, orange carrots, yellow beans, green lettuce, blue/purple berries, white/brown onions).   Avoid foods with added sugars (goal of <10 grams/serving of added sugar). Limit added sugars to less than 24 (women)-36 (men) grams daily.  Beverage recommendations:    Avoid caffeinated drinks such as coffee, energy drinks, and soda (both regular and diet). Consider sparkling water, water with lemon (or other fruit), or black, oolong, or green tea (prior to noon).   Avoid regular consumption of alcohol. If it is a special occasion the recommended alcohol intake for a male is 2 (men) or 1(women) or less drinks per day.  Alcohol consumption may place people with diabetes at increased risk for delayed hypoglycemia (low blood sugar) especially if taking other medications that may cause hypoglycemia such as insulin.     Lifestyle Recommendations  Avoid tobacco products (including chewing tobacco and vaping).   Continue to integrate regular movement and enjoyable forms of exercise into your weekly routine. The recommended exercise regimen is 150 minutes per week (for example 5 days per week, 30 minutes per day).    Consider walking for 10-15 minutes after each meal in order to help control blood sugar.   Manage/reduce stress  Consider therapy, mindfulness, breathing exercises (4-7-8 breath), meditation, yoga, journaling, addressing/removing stressors, etc.   Sleep  Goal of 7-9 hours of restful sleep nightly.      Dapagliflozin (Farxiga) Education:  Counseled patient on Dapagliflozin (Farxiga) MOA, expectations, side effects, duration of therapy, administration, and monitoring parameters.  Reviewed the benefits of SGLT-2i therapy, such as glycemic control and kidney and CV protection.  Advised patient to practice proper  hygiene to reduce risk of UTIs or yeast infections.  Advised patient to maintain adequate fluid intake to remain hydrated while on SGLT2i therapy.  Answered all patient questions and concerns.

## 2025-02-03 ENCOUNTER — APPOINTMENT (OUTPATIENT)
Dept: PRIMARY CARE | Facility: CLINIC | Age: 75
End: 2025-02-03
Payer: MEDICARE

## 2025-02-03 VITALS
SYSTOLIC BLOOD PRESSURE: 150 MMHG | OXYGEN SATURATION: 96 % | WEIGHT: 206.8 LBS | BODY MASS INDEX: 30.63 KG/M2 | HEIGHT: 69 IN | TEMPERATURE: 97.9 F | HEART RATE: 80 BPM | DIASTOLIC BLOOD PRESSURE: 80 MMHG

## 2025-02-03 DIAGNOSIS — E66.811 CLASS 1 OBESITY DUE TO EXCESS CALORIES WITHOUT SERIOUS COMORBIDITY WITH BODY MASS INDEX (BMI) OF 30.0 TO 30.9 IN ADULT: ICD-10-CM

## 2025-02-03 DIAGNOSIS — N18.31 CHRONIC KIDNEY DISEASE, STAGE 3A (MULTI): ICD-10-CM

## 2025-02-03 DIAGNOSIS — M72.2 PLANTAR FASCIITIS, RIGHT: ICD-10-CM

## 2025-02-03 DIAGNOSIS — E78.2 MIXED HYPERLIPIDEMIA: ICD-10-CM

## 2025-02-03 DIAGNOSIS — R97.20 ABNORMAL PSA: ICD-10-CM

## 2025-02-03 DIAGNOSIS — Z12.5 SCREENING FOR PROSTATE CANCER: ICD-10-CM

## 2025-02-03 DIAGNOSIS — E11.3293 TYPE 2 DIABETES MELLITUS WITH BOTH EYES AFFECTED BY MILD NONPROLIFERATIVE RETINOPATHY WITHOUT MACULAR EDEMA, WITHOUT LONG-TERM CURRENT USE OF INSULIN: ICD-10-CM

## 2025-02-03 DIAGNOSIS — M25.462 PAIN AND SWELLING OF LEFT KNEE: ICD-10-CM

## 2025-02-03 DIAGNOSIS — I10 PRIMARY HYPERTENSION: ICD-10-CM

## 2025-02-03 DIAGNOSIS — E66.09 CLASS 1 OBESITY DUE TO EXCESS CALORIES WITHOUT SERIOUS COMORBIDITY WITH BODY MASS INDEX (BMI) OF 30.0 TO 30.9 IN ADULT: ICD-10-CM

## 2025-02-03 DIAGNOSIS — M25.562 PAIN AND SWELLING OF LEFT KNEE: ICD-10-CM

## 2025-02-03 DIAGNOSIS — M25.562 ACUTE PAIN OF LEFT KNEE: ICD-10-CM

## 2025-02-03 DIAGNOSIS — Z00.00 MEDICARE ANNUAL WELLNESS VISIT, SUBSEQUENT: Primary | ICD-10-CM

## 2025-02-03 DIAGNOSIS — N40.0 BENIGN PROSTATIC HYPERPLASIA, UNSPECIFIED WHETHER LOWER URINARY TRACT SYMPTOMS PRESENT: ICD-10-CM

## 2025-02-03 LAB — POC HEMOGLOBIN A1C: 6.6 % (ref 4.2–6.5)

## 2025-02-03 PROCEDURE — G0439 PPPS, SUBSEQ VISIT: HCPCS | Performed by: FAMILY MEDICINE

## 2025-02-03 PROCEDURE — 4010F ACE/ARB THERAPY RXD/TAKEN: CPT | Performed by: FAMILY MEDICINE

## 2025-02-03 PROCEDURE — 1036F TOBACCO NON-USER: CPT | Performed by: FAMILY MEDICINE

## 2025-02-03 PROCEDURE — 3077F SYST BP >= 140 MM HG: CPT | Performed by: FAMILY MEDICINE

## 2025-02-03 PROCEDURE — 83036 HEMOGLOBIN GLYCOSYLATED A1C: CPT | Performed by: FAMILY MEDICINE

## 2025-02-03 PROCEDURE — 3008F BODY MASS INDEX DOCD: CPT | Performed by: FAMILY MEDICINE

## 2025-02-03 PROCEDURE — 1170F FXNL STATUS ASSESSED: CPT | Performed by: FAMILY MEDICINE

## 2025-02-03 PROCEDURE — 3079F DIAST BP 80-89 MM HG: CPT | Performed by: FAMILY MEDICINE

## 2025-02-03 PROCEDURE — 1159F MED LIST DOCD IN RCRD: CPT | Performed by: FAMILY MEDICINE

## 2025-02-03 PROCEDURE — 99214 OFFICE O/P EST MOD 30 MIN: CPT | Performed by: FAMILY MEDICINE

## 2025-02-03 PROCEDURE — 1157F ADVNC CARE PLAN IN RCRD: CPT | Performed by: FAMILY MEDICINE

## 2025-02-03 RX ORDER — PREDNISONE 10 MG/1
TABLET ORAL
Qty: 30 TABLET | Refills: 0 | Status: SHIPPED | OUTPATIENT
Start: 2025-02-03 | End: 2025-02-15

## 2025-02-03 ASSESSMENT — ACTIVITIES OF DAILY LIVING (ADL)
BATHING: INDEPENDENT
GROCERY_SHOPPING: INDEPENDENT
MANAGING_FINANCES: INDEPENDENT
TAKING_MEDICATION: INDEPENDENT
DRESSING: INDEPENDENT
DOING_HOUSEWORK: INDEPENDENT

## 2025-02-03 NOTE — PROGRESS NOTES
"Subjective   Patient ID: Mario Mendenhall \"Ed\" is a 74 y.o. male who presents for Medicare Annual Wellness Visit Subsequent, Hypertension, Hyperlipidemia, Diabetes, and Knee Pain.    HPI  Patient presents today for AWV, HTN, DM, HLD follow up.  He does eat a low sodium, low sugar, low cholesterol diet. He does not exercise. He does check his BP at home.  He does check his sugars at home.  Last eye exam was 3 months ago. Last dental exam was 3 weeks ago. Last BW was 6/28/24. Last A1C was 6.3%. Today A1c is 6.6%. Is on statin therapy.     Also presents today for left knee pain. Ongoing x since Thursday. Describes the pain as pull on the top of the knee. This occurred while bowling. Patient states he feels it the most while sitting up and down, currently using crutches to help take weight off it. Rates the pain as 7/10. Has tried crutches.               Advanced Care Planning  Mario Mendenhall \"Ed\" and I discussed their advance care plan preferences such as living will, and durable health care power of , which include: yes Attempt Resuscitation, yes Intubate & Ventilate, yes Comfort Care or Life- Sustaning Care. I reminded patient to talk with their health care agent, Juliet Mendenhall, about their health care goals. Note reflects patient's free will and care goals as expressed to me.       Review of systems  ; Patient seen today for exam denies any problems with headaches or vision, denies any shortness of breath chest pain nausea or vomiting, no black stool no blood in the stool no heartburn type symptoms denies any problems with constipation or diarrhea, and no dysuria-type symptoms    The patient's allergies medications were reviewed with them today    The patient's social family and surgical history or also reviewed here today, along with her past medical history.     Objective     Alert and active in  no acute distress  HEENT TMs clear oropharynx negative nares clear no drainage noted neck supple  With no " "adenopathy   Heart regular rate and rhythm without murmur and no carotid bruits  Lungs- clear to auscultation bilaterally, no wheeze or rhonchi noted  Thyroid -negative masses or nodularity  Abdomen- soft times four quadrants , bowel sounds positive no masses or organomegaly, negative tenderness guarding or rebound  Neurological exam unremarkable- DTRs in upper and lower extremities within normal limits.   skin -no lesions noted  Swelling noted around the left knee//    /80 (BP Location: Left arm, Patient Position: Sitting, BP Cuff Size: Adult)   Pulse 80   Temp 36.6 °C (97.9 °F) (Temporal)   Ht 1.753 m (5' 9\")   Wt 93.8 kg (206 lb 12.8 oz)   SpO2 96%   BMI 30.54 kg/m²     No Known Allergies    Assessment/Plan   Problem List Items Addressed This Visit       Diabetes (Multi)    Relevant Orders    POCT glycosylated hemoglobin (Hb A1C) manually resulted (Completed)    Albumin-Creatinine Ratio, Urine Random    HTN (hypertension)    Relevant Orders    CBC and Auto Differential    Comprehensive Metabolic Panel    Hyperlipidemia    Relevant Orders    Lipid Panel    Benign prostatic hyperplasia    Class 1 obesity due to excess calories without serious comorbidity with body mass index (BMI) of 30.0 to 30.9 in adult    Medicare annual wellness visit, subsequent - Primary    Chronic kidney disease, stage 3a (Multi)     Other Visit Diagnoses       Acute pain of left knee        Screening for prostate cancer        BMI 30.0-30.9,adult        Plantar fasciitis, right        Pain and swelling of left knee        Relevant Medications    predniSONE (Deltasone) 10 mg tablet    Other Relevant Orders    Referral to Orthopaedic Surgery    Abnormal PSA        Relevant Orders    PSA, total and free          Discussed his right plantar fascitis today.     Discussed his left knee pain symptoms. Prescribed prednisone and encouraged him to ice and use anti inflammatories as needed. He may notice a spike in glucose while on the " prednisone.     Labs have been ordered, he will have these performed and we will contact her/him with results.  (CBC, CMP, Lipid, PSA)    A1c performed today, 6.6%.    Ordered x-ray for the left knee and provided a referral to Dr. Bernabe.     If anything worsens or changes please call us at once, follow up in the office as planned,       Scribe Attestation  By signing my name below, I, Maris Ortiz , Sharmin   attest that this documentation has been prepared under the direction and in the presence of Brian Crow DO.

## 2025-02-13 ENCOUNTER — OFFICE VISIT (OUTPATIENT)
Dept: ORTHOPEDIC SURGERY | Facility: CLINIC | Age: 75
End: 2025-02-13
Payer: MEDICARE

## 2025-02-13 ENCOUNTER — HOSPITAL ENCOUNTER (OUTPATIENT)
Dept: RADIOLOGY | Facility: CLINIC | Age: 75
Discharge: HOME | End: 2025-02-13
Payer: MEDICARE

## 2025-02-13 DIAGNOSIS — M25.562 PAIN AND SWELLING OF LEFT KNEE: ICD-10-CM

## 2025-02-13 DIAGNOSIS — M25.462 PAIN AND SWELLING OF LEFT KNEE: ICD-10-CM

## 2025-02-13 PROCEDURE — 73564 X-RAY EXAM KNEE 4 OR MORE: CPT | Mod: LT

## 2025-02-13 PROCEDURE — 4010F ACE/ARB THERAPY RXD/TAKEN: CPT | Performed by: ORTHOPAEDIC SURGERY

## 2025-02-13 PROCEDURE — 1036F TOBACCO NON-USER: CPT | Performed by: ORTHOPAEDIC SURGERY

## 2025-02-13 PROCEDURE — 1157F ADVNC CARE PLAN IN RCRD: CPT | Performed by: ORTHOPAEDIC SURGERY

## 2025-02-13 PROCEDURE — 1159F MED LIST DOCD IN RCRD: CPT | Performed by: ORTHOPAEDIC SURGERY

## 2025-02-13 PROCEDURE — 99203 OFFICE O/P NEW LOW 30 MIN: CPT | Performed by: ORTHOPAEDIC SURGERY

## 2025-02-13 PROCEDURE — 99213 OFFICE O/P EST LOW 20 MIN: CPT | Performed by: ORTHOPAEDIC SURGERY

## 2025-02-13 NOTE — PROGRESS NOTES
History of Present Illness:   The patient presents today endorsing left knee pain. The pain localizes over the medial joint line.  The patient endorses left an injury around Sonia time and a secondary injury after the and the acute onset of pain, denying antecedent symptoms.  The pain is achy, constant, worse with activity and better with rest. The patient notes occasional locking, catching and giving out.  The patient has tried the following modalities: Anti-inflammatories, Medrol pack, rest and ice.    Retired, used to work as a     History reviewed. No pertinent past medical history.  History reviewed. No pertinent surgical history.    Current Outpatient Medications:     amLODIPine (Norvasc) 10 mg tablet, Take 1 tablet (10 mg) by mouth once daily., Disp: 30 tablet, Rfl: 1    aspirin 81 mg EC tablet, Take 1 tablet (81 mg) by mouth., Disp: , Rfl:     atorvastatin (Lipitor) 20 mg tablet, TAKE ONE TABLET BY MOUTH EVERY DAY, Disp: 90 tablet, Rfl: 1    benazepril (Lotensin) 20 mg tablet, TAKE ONE TABLET BY MOUTH TWO TIMES A DAY, Disp: 180 tablet, Rfl: 1    blood sugar diagnostic (OneTouch Ultra Test) strip, Use to test sugars twice daily., Disp: 200 strip, Rfl: 1    dapagliflozin propanediol (Farxiga) 5 mg, Take 1 tablet (5 mg) by mouth once daily., Disp: 30 tablet, Rfl: 2    garlic 200 mg tablet, Take 2 tablets by mouth once daily., Disp: , Rfl:     lancets misc, Use to test sugars twice daily., Disp: 200 each, Rfl: 1    metFORMIN  mg 24 hr tablet, Take 1 tablet (500 mg) by mouth 2 times daily (morning and late afternoon). Do not crush, chew, or split., Disp: 180 tablet, Rfl: 1    metoprolol succinate XL (Toprol-XL) 50 mg 24 hr tablet, Take 1.5 tablets (75 mg) by mouth once daily., Disp: 135 tablet, Rfl: 3    multivitamin tablet, Take 1 tablet by mouth once daily., Disp: , Rfl:     omega 3-dha-epa-fish oil 1,200 (144-216) mg capsule, Take by mouth., Disp: , Rfl:     predniSONE (Deltasone) 10 mg  tablet, Take 4 tablets (40 mg) by mouth once daily for 3 days, THEN 3 tablets (30 mg) once daily for 3 days, THEN 2 tablets (20 mg) once daily for 3 days, THEN 1 tablet (10 mg) once daily for 3 days., Disp: 30 tablet, Rfl: 0    timolol (Timoptic) 0.5 % ophthalmic solution, instill 1 drop into both eyes every morning, Disp: , Rfl:     Review of Systems   GENERAL: Negative for malaise, significant weight loss, fever  MUSCULOSKELETAL: See HPI  NEURO:  Negative for numbness / tingling     Physical Examination:  Left Knee:  Skin healthy and intact  No gross swelling or ecchymosis  No significant varus or valgus malalignment  Effusion: present  ROM:  Full flexion   Full extension  No pain with internal rotation of the hip  Tenderness to palpation:  medial joint line     No laxity to valgus stress  No laxity to varus stress  Negative Lachman´s test  Negative anterior drawer test  Negative posterior drawer test  Positive Tha´s test  Neurovascular exam normal distally    Imaging:  No significant degenerative changes lucency noted in medial femoral condyle possible insufficiency fracture    Assessment:    Patient with left knee pain concern for meniscal tear with possible concomitant insufficiency fracture    Plan:  We discussed with the patient concern for a meniscal tear.   We reviewed the natural history of meniscal pathology and discussed the implications for the health of the joint.  Various treatment options were discussed and the patient elected for: MRI for further evaluation.

## 2025-03-11 ENCOUNTER — TELEPHONE (OUTPATIENT)
Dept: PRIMARY CARE | Facility: CLINIC | Age: 75
End: 2025-03-11
Payer: MEDICARE

## 2025-03-11 DIAGNOSIS — E78.2 MIXED HYPERLIPIDEMIA: ICD-10-CM

## 2025-03-11 RX ORDER — ATORVASTATIN CALCIUM 20 MG/1
TABLET, FILM COATED ORAL
Qty: 132 TABLET | Refills: 0 | Status: SHIPPED | OUTPATIENT
Start: 2025-03-11

## 2025-03-11 NOTE — TELEPHONE ENCOUNTER
"Patient is calling because he went to fill his Atorvastatin 20 mg and the pharmacy told him it was too soon. He states Dr Crow has him taking it 2 tabs on alternating days. He needs a RX to reflect that sent to the pharmacy, he also needs the pharmacy called to make them aware of this  Wanted this message left with another provider since Dr Crow is out of the office today    Rx Refill Request Telephone Encounter    Name:  Mario Josephky \"Ed\"  : 1950     Medication Name:  atorvastatin  Dose (Optional):    20 mg  Quantity (Optional):      Directions (Optional):       ALLERGIES:   nkda    Specific Pharmacy location:  [facility]    Date of last appointment:  2/3/25  Date of next appointment:  none     Best number to reach patient:  [phone]    "

## 2025-03-12 ENCOUNTER — HOSPITAL ENCOUNTER (OUTPATIENT)
Dept: RADIOLOGY | Facility: CLINIC | Age: 75
Discharge: HOME | End: 2025-03-12
Payer: MEDICARE

## 2025-03-12 DIAGNOSIS — M25.462 PAIN AND SWELLING OF LEFT KNEE: ICD-10-CM

## 2025-03-12 DIAGNOSIS — M25.562 PAIN AND SWELLING OF LEFT KNEE: ICD-10-CM

## 2025-03-12 PROCEDURE — 73721 MRI JNT OF LWR EXTRE W/O DYE: CPT | Mod: LT

## 2025-03-20 LAB
ALBUMIN SERPL-MCNC: 4.5 G/DL (ref 3.6–5.1)
ALBUMIN/CREAT UR: 18 MG/G CREAT
ALP SERPL-CCNC: 52 U/L (ref 35–144)
ALT SERPL-CCNC: 21 U/L (ref 9–46)
ANION GAP SERPL CALCULATED.4IONS-SCNC: 11 MMOL/L (CALC) (ref 7–17)
AST SERPL-CCNC: 17 U/L (ref 10–35)
BASOPHILS # BLD AUTO: 71 CELLS/UL (ref 0–200)
BASOPHILS NFR BLD AUTO: 0.9 %
BILIRUB SERPL-MCNC: 0.8 MG/DL (ref 0.2–1.2)
BUN SERPL-MCNC: 22 MG/DL (ref 7–25)
CALCIUM SERPL-MCNC: 9.5 MG/DL (ref 8.6–10.3)
CHLORIDE SERPL-SCNC: 104 MMOL/L (ref 98–110)
CHOLEST SERPL-MCNC: 212 MG/DL
CHOLEST/HDLC SERPL: 3 (CALC)
CO2 SERPL-SCNC: 26 MMOL/L (ref 20–32)
CREAT SERPL-MCNC: 1.35 MG/DL (ref 0.7–1.28)
CREAT UR-MCNC: 119 MG/DL (ref 20–320)
EGFRCR SERPLBLD CKD-EPI 2021: 55 ML/MIN/1.73M2
EOSINOPHIL # BLD AUTO: 450 CELLS/UL (ref 15–500)
EOSINOPHIL NFR BLD AUTO: 5.7 %
ERYTHROCYTE [DISTWIDTH] IN BLOOD BY AUTOMATED COUNT: 13.8 % (ref 11–15)
GLUCOSE SERPL-MCNC: 129 MG/DL (ref 65–99)
HCT VFR BLD AUTO: 38.9 % (ref 38.5–50)
HDLC SERPL-MCNC: 70 MG/DL
HGB BLD-MCNC: 13.2 G/DL (ref 13.2–17.1)
LDLC SERPL CALC-MCNC: 112 MG/DL (CALC)
LYMPHOCYTES # BLD AUTO: 1493 CELLS/UL (ref 850–3900)
LYMPHOCYTES NFR BLD AUTO: 18.9 %
MCH RBC QN AUTO: 31.7 PG (ref 27–33)
MCHC RBC AUTO-ENTMCNC: 33.9 G/DL (ref 32–36)
MCV RBC AUTO: 93.5 FL (ref 80–100)
MICROALBUMIN UR-MCNC: 2.1 MG/DL
MONOCYTES # BLD AUTO: 814 CELLS/UL (ref 200–950)
MONOCYTES NFR BLD AUTO: 10.3 %
NEUTROPHILS # BLD AUTO: 5072 CELLS/UL (ref 1500–7800)
NEUTROPHILS NFR BLD AUTO: 64.2 %
NONHDLC SERPL-MCNC: 142 MG/DL (CALC)
PLATELET # BLD AUTO: 354 THOUSAND/UL (ref 140–400)
PMV BLD REES-ECKER: 9.2 FL (ref 7.5–12.5)
POTASSIUM SERPL-SCNC: 4.4 MMOL/L (ref 3.5–5.3)
PROT SERPL-MCNC: 6.9 G/DL (ref 6.1–8.1)
PSA FREE MFR SERPL: 28 % (CALC)
PSA FREE SERPL-MCNC: 1.5 NG/ML
PSA SERPL-MCNC: 5.4 NG/ML
RBC # BLD AUTO: 4.16 MILLION/UL (ref 4.2–5.8)
SODIUM SERPL-SCNC: 141 MMOL/L (ref 135–146)
TRIGL SERPL-MCNC: 188 MG/DL
WBC # BLD AUTO: 7.9 THOUSAND/UL (ref 3.8–10.8)

## 2025-03-25 ENCOUNTER — TELEPHONE (OUTPATIENT)
Dept: PRIMARY CARE | Facility: CLINIC | Age: 75
End: 2025-03-25
Payer: MEDICARE

## 2025-03-25 NOTE — TELEPHONE ENCOUNTER
----- Message from Brian Crow sent at 3/24/2025  5:38 PM EDT -----  Reviewed his labs are all very stable,, his PSA was 5.4, okay to continue to monitor in 6 months would get a recheck PSA and total PSA will we do his other blood tests see us then

## 2025-03-27 ENCOUNTER — OFFICE VISIT (OUTPATIENT)
Dept: ORTHOPEDIC SURGERY | Facility: CLINIC | Age: 75
End: 2025-03-27
Payer: MEDICARE

## 2025-03-27 DIAGNOSIS — M25.462 PAIN AND SWELLING OF LEFT KNEE: Primary | ICD-10-CM

## 2025-03-27 DIAGNOSIS — M17.10 ARTHRITIS OF KNEE: ICD-10-CM

## 2025-03-27 DIAGNOSIS — M25.562 PAIN AND SWELLING OF LEFT KNEE: Primary | ICD-10-CM

## 2025-03-27 PROCEDURE — 1157F ADVNC CARE PLAN IN RCRD: CPT | Performed by: ORTHOPAEDIC SURGERY

## 2025-03-27 PROCEDURE — 1036F TOBACCO NON-USER: CPT | Performed by: ORTHOPAEDIC SURGERY

## 2025-03-27 PROCEDURE — 1159F MED LIST DOCD IN RCRD: CPT | Performed by: ORTHOPAEDIC SURGERY

## 2025-03-27 PROCEDURE — 99214 OFFICE O/P EST MOD 30 MIN: CPT | Performed by: ORTHOPAEDIC SURGERY

## 2025-03-27 PROCEDURE — 4010F ACE/ARB THERAPY RXD/TAKEN: CPT | Performed by: ORTHOPAEDIC SURGERY

## 2025-03-27 RX ORDER — MELOXICAM 15 MG/1
15 TABLET ORAL DAILY
Qty: 30 TABLET | Refills: 0 | Status: SHIPPED | OUTPATIENT
Start: 2025-03-27 | End: 2025-04-26

## 2025-03-27 NOTE — PROGRESS NOTES
History of Present Illness:  Patient returns today to review his MRI.  He states the knee is improving with some gentle motion at home exercise program.  He endorses just occasional mechanical symptoms and states the swelling is improved.  Denies nocturnal pain.    Review of Systems:   GENERAL: Negative for malaise, significant weight loss, fever  MUSCULOSKELETAL: see HPI  NEURO:  Negative    Physical Examination:  Left Knee:  Skin healthy and intact  No gross swelling or ecchymosis  Alignment: No significant varus  Effusion: Small  ROM: Full  Crepitance with range of motion  No pain with internal rotation of the hip  Tenderness to palpation: over medial and lateral joint line and with patellar compression     Mild pain with Tha´s test    Neurovascular exam normal distally  2+ DP pulse and good cap refill    Imaging:  MRI demonstrates full-thickness chondral defects medial femoral condyle minimal degenerative meniscal pathology    Assessment:  Patient with right knee medial compartment osteoarthritis    Plan:  The MRI was discussed.  We reviewed the possibility of unicompartmental arthroplasty down the road based on the degree of arthrosis medially.  We discussed the possibility of corticosteroid injection if things worsen or the swelling recurs.    Physical therapy was encouraged particular based on his response to home exercise program.      A nonsteroidal anti-inflammatory drug (NSAID) was prescribed.  We reviewed the risks (including gastric issues, renal issues) and benefits of the medication.  We discussed a scheduled course if no adverse reactions to help with swelling / pain.  We discussed that chronic usage should be checked with primary care physician.

## 2025-03-28 ENCOUNTER — TELEPHONE (OUTPATIENT)
Dept: PRIMARY CARE | Facility: CLINIC | Age: 75
End: 2025-03-28
Payer: MEDICARE

## 2025-03-28 DIAGNOSIS — I10 PRIMARY HYPERTENSION: ICD-10-CM

## 2025-03-28 DIAGNOSIS — E11.8 CONTROLLED TYPE 2 DIABETES MELLITUS WITH COMPLICATION, WITHOUT LONG-TERM CURRENT USE OF INSULIN (MULTI): ICD-10-CM

## 2025-03-28 RX ORDER — METFORMIN HYDROCHLORIDE 500 MG/1
TABLET, EXTENDED RELEASE ORAL
Qty: 180 TABLET | Refills: 1 | Status: SHIPPED | OUTPATIENT
Start: 2025-03-28

## 2025-03-28 RX ORDER — BENAZEPRIL HYDROCHLORIDE 20 MG/1
TABLET ORAL
Qty: 180 TABLET | Refills: 1 | Status: SHIPPED | OUTPATIENT
Start: 2025-03-28

## 2025-03-28 NOTE — TELEPHONE ENCOUNTER
"Please advise     Mario Mendenhall \"Ed\"  [id number] [place] [address] Clinical Support Staff  Phone Number: [phone]     He is prescribing Meloxicam (Modic) 15 mg. for the pain and arthritis in my left knee. Is it safe to take with my other prescriptions? He asked me if I was on kidney medication and at the time I said no but then remembered that I take Amlodipine Besylate 10 mg. Thanks.  Ed  "

## 2025-03-28 NOTE — TELEPHONE ENCOUNTER
Sree Beltran MD  [id number] [id number] minutes ago (11:15 AM)       Please let him know Dr. Crow is out of the office for 2 weeks.  I reviewed his labs and notes.  Given his kidney function I believe it is safe for him to take the meloxicam but after a week or 2 I would start taking it every other day.  If you need to be on this medication for more than a month I would follow-up or talk with Dr. Crow about monitoring her kidney function.  Please let him know   LEFT VM WITH PT

## 2025-04-01 ENCOUNTER — EVALUATION (OUTPATIENT)
Dept: PHYSICAL THERAPY | Facility: CLINIC | Age: 75
End: 2025-04-01
Payer: MEDICARE

## 2025-04-01 DIAGNOSIS — M17.10 ARTHRITIS OF KNEE: ICD-10-CM

## 2025-04-01 DIAGNOSIS — S83.412D SPRAIN OF MEDIAL COLLATERAL LIGAMENT OF LEFT KNEE, SUBSEQUENT ENCOUNTER: Primary | ICD-10-CM

## 2025-04-01 PROCEDURE — 97161 PT EVAL LOW COMPLEX 20 MIN: CPT | Mod: GP

## 2025-04-01 PROCEDURE — 97110 THERAPEUTIC EXERCISES: CPT | Mod: GP

## 2025-04-01 ASSESSMENT — ENCOUNTER SYMPTOMS
OCCASIONAL FEELINGS OF UNSTEADINESS: 0
DEPRESSION: 0
LOSS OF SENSATION IN FEET: 0

## 2025-04-01 ASSESSMENT — PATIENT HEALTH QUESTIONNAIRE - PHQ9
2. FEELING DOWN, DEPRESSED OR HOPELESS: NOT AT ALL
SUM OF ALL RESPONSES TO PHQ9 QUESTIONS 1 AND 2: 0
1. LITTLE INTEREST OR PLEASURE IN DOING THINGS: NOT AT ALL

## 2025-04-01 NOTE — PROGRESS NOTES
Patient Name: Mario Mendenhall  MRN: [id number]  Time Calculation  Start Time: 925  Stop Time: 1000  Time Calculation (min): 35 min  PT Evaluation Time Entry  PT Evaluation (Low) Time Entry: 25  PT Therapeutic Procedures Time Entry  Therapeutic Exercise Time Entry: 10        Current Problem  1. Sprain of medial collateral ligament of left knee, subsequent encounter        2. Arthritis of knee  Referral to Physical Therapy    Follow Up In Physical Therapy        Insurance    Insurance reviewed   Visit number: **  Approved number of visits: **  Visit#1  ANTHEM MEDICARE BOA COPAY 30 DED 0  COVERAGE 100 OOP 4150(280) AUTH REQ# [id number]  1 VISIT 25 THRU 4-15-25       Subjective     General:   **Session limited as patient arrived 10 minutes late to session**    Pt is a 75 y.o male presenting to clinic with c/o L knee pain present since around [place] time last year. He notes onset of pain when bowling in late December requiring him to use crutches for a short-term to which he has progressively weaned away from. He states experiencing a sharp pain during bowling that has progressed to just an occasional dull ache with use or prolonged walking. Currently having difficulty with deep squatting/bending along with prolonged activities and walking. He denies any numbness/tingling into/around knee or foot. He notes MRI results a couple of weeks ago noted some ligamentous and soft tissue damage around inner knee (MCL and meniscus). 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 Main goal with therapy at this time is to return to yard work, construction duties, and mainly bowling pain-free.    Walking tolerance: 10-15 minutes  Standing tolerance: 60 minutes    Precautions:  None  Pain:  Current:  0/10  High: 5/10  Low: 1/10  Av/10  "    Aggravating Factors: Twisting/turning, deep bending, prolonged walking  Alleviating Factors: Rest, ice, elevation    Reviewed medical screening form with pt and medical screening assessed    Imaging:   MRI 3/12/25 L Knee:  \"IMPRESSION:  Severe osteoarthritic changes in the medial compartment with areas of  full-thickness articular cartilage loss, greater at the femoral side.      Superimposed diffuse complex tearing of the medial meniscus as  described above.      Grade 1 sprain of the medial collateral ligament.      Moderate-sized joint effusion and tiny Baker's cyst. There is focal  capsular thickening or low signal loose body within the posterior  joint, just lateral of midline.      No acute osseous abnormality.\"    Objective   Knee Musculoskeletal Exam  Gait    Antalgic: left    Limp: left    Palpation    Right      Tenderness: none      Left      Tenderness: present          MCL: mild          Medial joint line: mild      Range of Motion    Right      Active extension: 0      Active flexion: 130    Left      Active extension: 0      Active flexion: 120    Strength    Right      Extension: 4+/5.       Flexion: 4+/5.     Left      Extension: 4-/5.       Flexion: 4-/5. Flexion is affected by pain.      Instability    Right      Instability signs: none - stable    Left      Varus stress grade: normal      Valgus stress grade: 1+      Pivot shift: normal      Anterior drawer: normal      Posterior drawer: normal      Medial Tha test: negative      Lateral Tha test: negative    Neurovascular    Right      Right knee neurovascular exam is normal.      Left      Left knee neurovascular exam is normal.      Special Signs    Right      Straight leg raise: normal    Left      Straight leg raise: normal    Hip Musculoskeletal Exam  Gait    Antalgic: left    Limp: left    Range of Motion    Right      Right hip range of motion is within functional limits.     Left      Left hip range of motion is within " functional limits.     Strength    Right      Extension: 4+/5.       Flexion: 4+/5.       Internal rotation: 4/5.       External rotation: 4/5.       Adduction: 4/5.       Abduction: 4/5.     Left      Extension: 4+/5.       Flexion: 4+/5.       Internal rotation: 4-/5. Internal rotation is affected by pain.       External rotation: 4-/5. External rotation is affected by pain.       Adduction: 4/5.       Abduction: 4/5.     Neurovascular    Right        Right hip neurovascular exam is normal.    Left        Left hip neurovascular exam is normal.    Special Tests    Right      TAMIKO test (right): negative      Impingement test: negative    Left      TAMIKO test (left): positive      Impingement test: negative    Special tests additional comments: TAMIKO R/L: 5 in/8 in       Outcome Measures:  LEFS:  54/80       Treatment: See HEP below  SLR 2x12  Bridge with RTB for abduction 2x12  STS with RTB around knees 2x12  3-way hip RTB x10 ea way    EDUCATION/HEP:  Pt educated on POC and expected interventions during treatment. He was further encouraged to maintain with current mobility and increase strength of quad and lateral hip musculature to decrease tension on knee joint and improve gait. He was further educated on the importance of maintaining hip strength to promote a less antalgic/limp gait ultimately decreased stress at inner knee.     Assessment:      **Session limited as patient arrived 10 minutes late to session**    Pt is a 75 y.o male coming to clinic with primary complaint of L knee pain . On exam is demonstrating  slightly decreased ROM and strength along with pain upon testing . 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 Recommend skilled PT to address the aforementioned deficits and allow pt to restore PLOF and maximize functional capacity. Anticipate good prognosis  given nature of condition, low number of co-morbidities, attitude towards therapy, and support system.     Clinical Presentation: Stable     Level of Complexity: Low     Goals:  Patient will improve L/R knee strength to >/=4+/5 for improved knee stability.    Patient will be independent with HEP.    Patient will improve LEFS score to >/= 65/80 to demonstrate increased independence and tolerance to bending/squatting activities.    Patient will self-report increasing walking tolerance >/= 30 minutes to demonstrate improved stability and decreased sensitivity at L knee joint.    Patient will demonstrate a symmetrical gait pattern without the L limp gait pattern to demonstrate improved tolerance to weightbearing positions.      Plan  1x/week for 8 visits     Planned interventions include: blood flow restriction, aquatic therapy, biofeedback, cryotherapy, dry needling, edema control, education/instruction, electrical stimulation, gait training, home program, hot pack, kinesiotaping, manual therapy, neuromuscular re-education, self care/home management, therapeutic activities, therapeutic exercises, ultrasound and vasopneumatic device w/ cold.

## 2025-04-07 ENCOUNTER — APPOINTMENT (OUTPATIENT)
Dept: PHARMACY | Facility: HOSPITAL | Age: 75
End: 2025-04-07
Payer: MEDICARE

## 2025-04-07 DIAGNOSIS — E11.3293 TYPE 2 DIABETES MELLITUS WITH BOTH EYES AFFECTED BY MILD NONPROLIFERATIVE RETINOPATHY WITHOUT MACULAR EDEMA, WITHOUT LONG-TERM CURRENT USE OF INSULIN: ICD-10-CM

## 2025-04-07 DIAGNOSIS — E11.8 CONTROLLED TYPE 2 DIABETES MELLITUS WITH COMPLICATION, WITHOUT LONG-TERM CURRENT USE OF INSULIN (MULTI): ICD-10-CM

## 2025-04-07 RX ORDER — ATORVASTATIN CALCIUM 40 MG/1
TABLET, FILM COATED ORAL
Qty: 70 TABLET | Refills: 3 | Status: SHIPPED | OUTPATIENT
Start: 2025-04-07

## 2025-04-07 RX ORDER — METFORMIN HYDROCHLORIDE 500 MG/1
500 TABLET, EXTENDED RELEASE ORAL
Qty: 180 TABLET | Refills: 3 | Status: SHIPPED | OUTPATIENT
Start: 2025-04-07

## 2025-04-07 NOTE — TELEPHONE ENCOUNTER
LOV: 10/19/2022  Pt needs appt for further refills  Please ask if a short supply is needed  Thank you!    
Patient scheduled appointment.  
99.8
0

## 2025-04-07 NOTE — ASSESSMENT & PLAN NOTE
Patient's goal A1c is < 7%.  Is pt at goal? Yes, 6.6% in March 2025  Patient's SMBGs are controlled when he checks (infrequently).     Rationale for plan: The patient feels well on his current regimen. He has no concerns for side effects and his A1c is at target. He has had no issues getting Farxiga from the  since applying to their savings program. He does need a refill on metformin today. Additionally, he mentions insurance does not wish to cover more than 30 tablets of atorvastatin per 30 day supply. Atorvastatin 40 mg tablets may be split. Given this, provided 40 mg tablets and may cut in half for lower dose days.    Medication Changes:  CONTINUE  Farxiga 5 mg daily  Metformin  mg twice daily  Atorvastatin 40 mg daily 4 times per week, 20 mg daily 3 times per week

## 2025-04-07 NOTE — PROGRESS NOTES
"      Patient ID: Mario Mendenhall \"Ed\" is a 75 y.o. male who presents for Diabetes.  Pt is here for Follow Up appointment.     PCP/Referring Provider: Brian Crow DO  Last Visit: 2/3/2025  Next visit: not yet scheduled    Verbal consent to manage patient's drug therapy was obtained from patient. They were informed they may decline to participate or withdraw from participation in pharmacy services at any time.     assistance for Farxiga approved. First shipment sent 1/22 to pt's home. Refill sent on 3/20/2025.    Subjective   Treatment Adherence:   Patient did take medications today.   Number of missed doses in last 7 days: 0  Can patient afford prescribed medications: No, Farxiga expensive . On  assistance at this time.    Preferred pharmacy:     [id number] [address]  [address] [facility] [address]  Phone: [phone] Fax: [phone]    [facility]  [address], [address]  [facility] [address]  Phone: [phone] Fax: [phone]    DIABETES MELLITUS TYPE 2:    Known diabetic complications: none.  Does patient follow with Endocrinology?: no   Eyes: September last year- will plan to schedule new one soon  Podiatry: no cuts or sores, heals appropriately if he does note injury     Current diabetic medications include:  Farxiga 5mg daily  Metformin XR 500mg twice daily    Clarifications to above regimen: none  Adverse Effects: none reported     Home blood glucose records (mg/dL)  Current: often 140-150- A1c is at target  Last visit: FBG: <130s    Any episodes of hypoglycemia? No, denies .    Did patient treat episode of hypoglycemia appropriately? N/A  Does the patient have a prescription for ready-to-use Glucagon? Not on insulin     Does pt have proteinuria? No   If appropriate, is patient on ACEi/ARB? N/A    Secondary Prevention  Statin? Yes  ACE-I/ARB? Yes  Aspirin? Yes    Pertinent PMH Review:  PMH of " Pancreatitis: No  PMH of Retinopathy: No  PMH of Urinary Tract Infections: No  PMH of MTC/MEN2: No    Lifestyle:  Currently once weekly PT for his knee in addition to baseline exercise    Objective     BP Readings from Last 4 Encounters:   02/03/25 150/80   01/22/25 (!) 183/100   12/16/24 132/80   09/27/24 144/85      There were no vitals filed for this visit.     Labs  CREATININE   Date Value Ref Range Status   03/19/2025 1.35 (H) 0.70 - 1.28 mg/dL Final     EGFR   Date Value Ref Range Status   03/19/2025 55 (L) > OR = 60 mL/min/1.73m2 Final     SODIUM   Date Value Ref Range Status   03/19/2025 141 135 - 146 mmol/L Final     POTASSIUM   Date Value Ref Range Status   03/19/2025 4.4 3.5 - 5.3 mmol/L Final     CHLORIDE   Date Value Ref Range Status   03/19/2025 104 98 - 110 mmol/L Final     UREA NITROGEN (BUN)   Date Value Ref Range Status   03/19/2025 22 7 - 25 mg/dL Final     AST   Date Value Ref Range Status   03/19/2025 17 10 - 35 U/L Final        Lab Results   Component Value Date    BILITOT 0.8 03/19/2025    CALCIUM 9.5 03/19/2025    CO2 26 03/19/2025     03/19/2025    CREATININE 1.35 (H) 03/19/2025    GLUCOSE 129 (H) 03/19/2025    ALKPHOS 52 03/19/2025    K 4.4 03/19/2025    PROT 6.9 03/19/2025     03/19/2025    AST 17 03/19/2025    ALT 21 03/19/2025    BUN 22 03/19/2025    ANIONGAP 11 03/19/2025    ALBUMIN 4.5 03/19/2025    GFRMALE 51 (A) 06/22/2023     Lab Results   Component Value Date    TRIG 188 (H) 03/19/2025    CHOL 212 (H) 03/19/2025    LDLCALC 112 (H) 03/19/2025    HDL 70 03/19/2025     Lab Results   Component Value Date    HGBA1C 6.6 (A) 02/03/2025       Current Outpatient Medications   Medication Instructions    amLODIPine (NORVASC) 10 mg, oral, Daily    aspirin 81 mg    atorvastatin (Lipitor) 40 mg tablet Take 1 tablet by mouth on Sundays, Tuesdays, Thursdays, and Saturdays. Take 1/2 tablet by mouth on Mondays, Wednesdays, and Fridays.    benazepril (Lotensin) 20 mg tablet TAKE ONE  TABLET BY MOUTH TWO TIMES A DAY    blood sugar diagnostic (OneTouch Ultra Test) strip Use to test sugars twice daily.    dapagliflozin propanediol (FARXIGA) 5 mg, oral, Daily    garlic 200 mg tablet 2 tablets, Daily    lancets misc Use to test sugars twice daily.    meloxicam (MOBIC) 15 mg, oral, Daily    metFORMIN XR (GLUCOPHAGE-XR) 500 mg, oral, 2 times daily (morning and late afternoon), Do not crush, chew, or split.    metoprolol succinate XL (TOPROL-XL) 75 mg, oral, Daily    multivitamin tablet 1 tablet, Daily    omega 3-dha-epa-fish oil 1,200 (144-216) mg capsule Take by mouth.    timolol (Timoptic) 0.5 % ophthalmic solution instill 1 drop into both eyes every morning        DRUG INTERACTIONS:  None at time of review    Assessment/Plan   Problem List Items Addressed This Visit             ICD-10-CM    Diabetes (Multi) E11.9     Patient's goal A1c is < 7%.  Is pt at goal? Yes, 6.6% in March 2025  Patient's SMBGs are controlled when he checks (infrequently).     Rationale for plan: The patient feels well on his current regimen. He has no concerns for side effects and his A1c is at target. He has had no issues getting Farxiga from the  since applying to their savings program. He does need a refill on metformin today. Additionally, he mentions insurance does not wish to cover more than 30 tablets of atorvastatin per 30 day supply. Atorvastatin 40 mg tablets may be split. Given this, provided 40 mg tablets and may cut in half for lower dose days.    Medication Changes:  CONTINUE  Farxiga 5 mg daily  Metformin  mg twice daily  Atorvastatin 40 mg daily 4 times per week, 20 mg daily 3 times per week            Other Visit Diagnoses         Codes    Controlled type 2 diabetes mellitus with complication, without long-term current use of insulin (Multi)     E11.8    Relevant Medications    metFORMIN  mg 24 hr tablet    atorvastatin (Lipitor) 40 mg tablet    Other Relevant Orders    Referral to  Clinical Pharmacy            Immunizations needed:  RSV recommended    Labs ordered:  none     Referrals:  none     Follow-up: 7/7 1PM      Patient was provided with PharmD phone number and encouraged to reach out with any questions or concerns Prior to next appointment or ask provider for another pharmacy referral.    Thank you for allowing to take part in the care of this patient.    Maris Taveras PharmD  Clinical pharmacist  Phone number: [phone]    Continue all meds under the continuation of care with the referring provider and clinical pharmacy team.    Verbal consent to manage patient's drug therapy was obtained from the patient. They were informed they may decline to participate or withdraw from participation in pharmacy services at any time.

## 2025-04-09 ENCOUNTER — TREATMENT (OUTPATIENT)
Dept: PHYSICAL THERAPY | Facility: CLINIC | Age: 75
End: 2025-04-09
Payer: MEDICARE

## 2025-04-09 DIAGNOSIS — M17.10 ARTHRITIS OF KNEE: ICD-10-CM

## 2025-04-09 DIAGNOSIS — S83.412D SPRAIN OF MEDIAL COLLATERAL LIGAMENT OF LEFT KNEE, SUBSEQUENT ENCOUNTER: Primary | ICD-10-CM

## 2025-04-09 PROBLEM — S83.412A SPRAIN OF MEDIAL COLLATERAL LIGAMENT OF LEFT KNEE: Status: ACTIVE | Noted: 2025-04-09

## 2025-04-09 PROCEDURE — 97110 THERAPEUTIC EXERCISES: CPT | Mod: GP,CQ

## 2025-04-09 ASSESSMENT — PAIN SCALES - GENERAL: PAINLEVEL_OUTOF10: 3

## 2025-04-09 ASSESSMENT — PAIN DESCRIPTION - DESCRIPTORS: DESCRIPTORS: SORE

## 2025-04-09 ASSESSMENT — PAIN - FUNCTIONAL ASSESSMENT: PAIN_FUNCTIONAL_ASSESSMENT: 0-10

## 2025-04-09 NOTE — PROGRESS NOTES
"Physical Therapy Treatment    Patient Name: Mario Mendenhall  MRN: [id number]  Today's Date: 4/9/2025  Time Calculation  Start Time: 1000  Stop Time: 1044  Time Calculation (min): 44 min    Insurance  Visit#2  [employer]HE[employer] MEDICARE BOA COPAY 30 DED 0  COVERAGE 100 OOP 4150(280) AUTH REQ# [id number]  1 VISIT 04-01-25 THRU 4-15-25       Current Problem  1. Sprain of medial collateral ligament of left knee, subsequent encounter        2. Arthritis of knee  Follow Up In Physical Therapy          Subjective    General   Pt reports doing well with initial visit.  No increase in c/o pain reported.  Reports he has been working on HEP & overall pain level has been \"gradually improving\".  No new complaints.     Precautions  Precautions  Precautions Comment: No recent falls reported    Pain  Pain Assessment  Pain Assessment: 0-10  0-10 (Numeric) Pain Score: 3  Pain Location: Knee  Pain Orientation: Left  Pain Radiating Towards: none reported  Pain Descriptors: Sore  Pain Frequency: Intermittent  Clinical Progression: Gradually improving  Effect of Pain on Daily Activities: Walking, Squatting, Negotiating Stairs    Objective   No c/o numbness, tingling  No assistive device used for ambulation  Mild antalgic gait observed.    Treatments:  Therapeutic Exercises (56014): 41 Minutes, 3 Units    Activities:  CARINA: 5 min  QS: 5\" x15  SLR 2x10  SL SLR: 2x10  Clamshell: RTB, 2x10  Bridge with RTB for abduction 2x10  LAQ w/add squeeze: 3\" x20  STS with RTB around knees 2x12  Hip PRE's,3-way: RTB, x15 ea way, JONATHAN  TB Sidestepping (along plynth): 3 laps (4 steps each way)  TB March: RTB, x15     Assessment:   Initiated CARINA this visit to increase overall mobility, strength & endurance.  He did well with this.  Reviewed activities initiated first visit, providing vc's as needed for improved technique.  Also progressed with addition of SL SLR, TB clamshell, LAQ w/add squeeze, TB sidestepping & TB march for further strengthening.  Pt exhibits good " recall of the activities & had good follow through to any vc's provided.  He completes activities with good tolerance, normal muscle soreness, fatigue reported.   Provided pt with updated handouts for HEP.  Demonstrates good ability to continue with POC & progressions as tolerated.    Plan:   Continue to progress with rehab POC as tolerated to increase LE strength, stability & improve overall functional mobility, capacity for daily activities.    OP EDUCATION:   Access Code: [email or web address]  URL: [email or web address]  Date: 04/09/2025  Prepared by: Cheryl Hayes    Exercises  - Clamshell with Resistance  - 2 x daily - 7 x weekly - 3 sets - 10 reps  - Sidelying Hip Abduction  - 2 x daily - 7 x weekly - 3 sets - 10 reps  - Marching with Resistance  - 2 x daily - 7 x weekly - 3 sets - 10 reps  - Side Stepping with Resistance at Ankles  - 2 x daily - 7 x weekly - 3 sets  - Seated Long Arc Quad with Hip Adduction  - 2 x daily - 7 x weekly - 3 sets - 10 reps - 5 hold

## 2025-04-16 ENCOUNTER — TREATMENT (OUTPATIENT)
Dept: PHYSICAL THERAPY | Facility: CLINIC | Age: 75
End: 2025-04-16
Payer: MEDICARE

## 2025-04-16 DIAGNOSIS — M17.10 ARTHRITIS OF KNEE: ICD-10-CM

## 2025-04-16 DIAGNOSIS — S83.412D SPRAIN OF MEDIAL COLLATERAL LIGAMENT OF LEFT KNEE, SUBSEQUENT ENCOUNTER: Primary | ICD-10-CM

## 2025-04-16 PROCEDURE — 97110 THERAPEUTIC EXERCISES: CPT | Mod: GP,CQ

## 2025-04-16 NOTE — PROGRESS NOTES
"Physical Therapy Treatment    Patient Name: Mario Mendenhall  MRN: [id number]  Today's Date: 4/16/2025  Time Calculation  Start Time: 1000  Stop Time: 1035  Time Calculation (min): 35 min  PT Therapeutic Procedures Time Entry  Therapeutic Exercise Time Entry: 33       Insurance  Visit#3/8  ANTHEM MEDICARE BOA COPAY 30 DED 0  COVERAGE 100 OOP 4150(280) AUTH REQ# [id number]  1 VISIT 04-01-25 THRU 4-15-25     Current Problem  1. Sprain of medial collateral ligament of left knee, subsequent encounter        2. Arthritis of knee  Follow Up In Physical Therapy          Subjective   General   Pt. Reports he is doing better and the knee feels well.   Precautions     Pain       Objective   Treatments:  CARINA: 5 min  QS: 5\" x15  SLR 2x10  SL SLR: 2x10  Clamshell: RTB, 2x10  Bridge with RTB for abduction 2x10  LAQ w/add squeeze: 3\" x20  STS 2x10  Hip PRE's,3-way: RTB, x15 ea way, JONATHAN  TB Sidestepping (along plynth): 4 laps  TB March: RTB, x20  Step overs 6\" x20  Tapdowns 6\" 2x10  SLS 10\"x10 Blue foam    Assessment:   Pt. Progressing w/ all exercises well. Added step overs, tapdowns, and SLS for increasing LE strength/balance. Pt. Stated he did not have any increase in pain, but his knee did start to increase in stiffness after SLS on the foam. Pt. Will continue w/ current HEP and will be given updated HEP NV as long as symptoms do not increase after today's visit.     Plan:   Continue to increase strength/endurance.     OP EDUCATION:       Goals:     " 4 = completely dependent

## 2025-04-23 ENCOUNTER — TREATMENT (OUTPATIENT)
Dept: PHYSICAL THERAPY | Facility: CLINIC | Age: 75
End: 2025-04-23
Payer: MEDICARE

## 2025-04-23 DIAGNOSIS — S83.412D SPRAIN OF MEDIAL COLLATERAL LIGAMENT OF LEFT KNEE, SUBSEQUENT ENCOUNTER: ICD-10-CM

## 2025-04-23 DIAGNOSIS — M17.10 ARTHRITIS OF KNEE: Primary | ICD-10-CM

## 2025-04-23 PROCEDURE — 97110 THERAPEUTIC EXERCISES: CPT | Mod: GP,CQ

## 2025-04-23 ASSESSMENT — PAIN SCALES - GENERAL: PAINLEVEL_OUTOF10: 2

## 2025-04-23 ASSESSMENT — PAIN - FUNCTIONAL ASSESSMENT: PAIN_FUNCTIONAL_ASSESSMENT: 0-10

## 2025-04-23 ASSESSMENT — PAIN DESCRIPTION - DESCRIPTORS: DESCRIPTORS: SORE

## 2025-04-23 NOTE — PROGRESS NOTES
"Physical Therapy Treatment    Patient Name: Mario Mendenhall  MRN: [id number]  Today's Date: 4/23/2025  Time Calculation  Start Time: 1000  Stop Time: 1042  Time Calculation (min): 42 min     PT Therapeutic Procedures Time Entry  Therapeutic Exercise Time Entry: 42                 Insurance:   Visit#4/8  [employer]HE[employer] MEDICARE BOA COPAY 30 DED 0  COVERAGE 100 OOP 4150(280) AUTH REQ# [id number]  1 VISIT 04-01-25 TH    ANTH APPROVED  6  PT  VISITS  4-7-25 THRU 6-5-25  AUTH# [id number]  [id number]    Assessment:   Assessed pt's ability to negotiate flight of stairs. He is able to ascend and descend reciprocally however has some hesitation w/ ascending dt quad weakness and demo's dec eccentric control on descending, requiring Handrail to complete for balance purposes. Progressed his program today w/ addition of 2# wt for LAQ and increased reps w/ appropriate muscle fatigue observed. Added Yellow tband to sidestepping and AIREX under feet for sit to stands to challenge his balance. Pt able to correct w/o LOB. Appropriate challenge w/ given ex's this visit but no pain c/o at session end.     Plan:   Continue to increase strength/endurance. Re-check sched 5/14.     Current Problem  1. Arthritis of knee  Follow Up In Physical Therapy      2. Sprain of medial collateral ligament of left knee, subsequent encounter            Subjective   General   Pt states he has \"minimal to no pain\" in the L knee coming in for his appt. Reports having the most difficulty w/ going up the stairs, both due to pain and weakness LLE. \"I want to get stronger. I'm definitely noticing an improvement.\"   Precautions       Pain  Pain Assessment: 0-10  0-10 (Numeric) Pain Score: 2  Pain Location: Knee  Pain Orientation: Left  Pain Descriptors: Sore  Pain Frequency: Intermittent  Clinical Progression: Gradually improving    Objective       Treatments:  Therapeutic Exercise (08351):   CARINA: 6 min  QS: 5\" x15--  SLR 2x10  SL SLR: 2x10  Clamshell: RTB, " "2x10  Bridge with RTB for abduction 2x10  LAQ: 2# 3\" 3x10  STS AIREX x10  Hip PRE's,3-way: RTB, x15 ea way, JONATHAN  TB Sidestepping (along window sill): YTB 3 laps  TB March: RTB, x20 --  Step ups F/L: 6\" x20 ea  Step overs 6\" x20  Tapdowns 4\" 2x10 (resume 6 inch NV)   SLS 10\"x10 Blue foam--    EDUCATION:         "

## 2025-04-30 ENCOUNTER — TREATMENT (OUTPATIENT)
Dept: PHYSICAL THERAPY | Facility: CLINIC | Age: 75
End: 2025-04-30
Payer: MEDICARE

## 2025-04-30 DIAGNOSIS — M17.10 ARTHRITIS OF KNEE: ICD-10-CM

## 2025-04-30 PROCEDURE — 97110 THERAPEUTIC EXERCISES: CPT | Mod: GP

## 2025-04-30 NOTE — PROGRESS NOTES
"Physical Therapy Treatment    Patient Name: Mario Mendenhall  MRN: [id number]  Today's Date: 4/30/2025  Time Calculation  Start Time: 1000  Stop Time: 1041  Time Calculation (min): 41 min     PT Therapeutic Procedures Time Entry  Therapeutic Exercise Time Entry: 41        Insurance:   Visit#6/8  ANTHEM MEDICARE BOA COPAY 30 DED 0  COVERAGE 100 OOP 4150(280) AUTH REQ# [id number]  1 VISIT 04-01-25 TH    ANTHEM APPROVED  6  PT  VISITS  4-7-25 THRU 6-5-25  AUTH# [id number]  [id number]/ALL      Current Problem  1. Arthritis of knee  Follow Up In Physical Therapy          Subjective   General   Pt notes generally having improvements session to session but is still limited in stair climbing s/t to strength deficits. Otherwise, he states no new concerns and is happy with current progress thus far.    Pain   1-2/10    Objective   Treatments:  Therapeutic Exercise (35409):   CARINA: 6 min  SL SLR with 2# ankle weight: 2x15  Bridge with RTB for abduction 2x15  LAQ: 2# 3\" 2x15  Goblet squat with 10# with RTB around knees 2x15  3 way hip RTB 2x15  Power Step ups F/L: 6\" x20 ea  Tapdowns 6\" 2x10      EDUCATION:   Pt was encouraged to continue to walk and progress current HEP to higher rep scheme to increase muscular endurance and improve tolerance to longer activities including stairs. He was instructed on the benefit of eccentric training especially with heel taps and its relation and carryover to increasing stair climbing tolerance.    Assessment:  Pt tolerated session well with notable fatigue in the middle of session thus benefiting from rest. He was able to complete entire session without any increase in pain or compensation despite reported fatigue. He stated feeling good at and of session and demonstrated reciprocal stair climbing both up/down with only minimal compensations noted when descending. He will continue to benefit from therapy focusing on stair climbing efficiency, LE strength and muscular endurance to improve mobility " and function.    Plan:   Continue to increase strength/endurance. Re-check sched 5/14.

## 2025-05-07 ENCOUNTER — TREATMENT (OUTPATIENT)
Dept: PHYSICAL THERAPY | Facility: CLINIC | Age: 75
End: 2025-05-07
Payer: MEDICARE

## 2025-05-07 DIAGNOSIS — M17.10 ARTHRITIS OF KNEE: ICD-10-CM

## 2025-05-07 PROCEDURE — 97110 THERAPEUTIC EXERCISES: CPT | Mod: GP

## 2025-05-07 NOTE — PROGRESS NOTES
"Physical Therapy Treatment    Patient Name: Mario Mendenhall  MRN: [id number]  Today's Date: 5/7/2025  Time Calculation  Start Time: 0958  Stop Time: 1036  Time Calculation (min): 38 min     PT Therapeutic Procedures Time Entry  Therapeutic Exercise Time Entry: 38        Insurance:   Visit#7/8  ANTH MEDICARE BOA COPAY 30 DED 0  COVERAGE 100 OOP 4150(280) AUTH REQ# [id number]  1 VISIT 04-01-25 TH    ANTHEM APPROVED  6  PT  VISITS  4-7-25 THRU 6-5-25  AUTH# [id number]  [id number]/ALL      Current Problem  1. Arthritis of knee  Follow Up In Physical Therapy          Subjective   General   Pt notes doing well since last visit and is happy with progress. He notes still being limited by stairs. He states he has almost no limitations outside of stairs. He states he is planning on mowing the lawn today. He notes he only has pain when \"he stresses it\" which includes longer activities, but he notes pain is only minimal if it exists. Otherwise, no new concerns mentioned.    Pain   1-2/10    Objective   Treatments:  Therapeutic Exercise (98167):   CARINA: 6 min  LAQ: 2# 3\" eccentric count 2x15  Goblet squat with 15# with GTB around knees 2x15 3 sec eccentric count  3 way hip GTB 2x15  Power Step ups F/L: 8\" x20 ea  Tapdowns 8\" 2x10  DL leg press @40 lbs 2x15  SL Leg press @20 lbs 2x15 ea side  Flight of stairs up/down x1      EDUCATION:   Pt educated to use heat as a modality for pain rather than ice. He was encouraged to use ice with increased swelling or if preferred. He was educated to continue with current HEP with endurance focus with no modifications. He was encouraged to rest between sets and continue active muscle recovery methods discussed before.    Assessment:  Pt tolerated session well with focus on muscular endurance and minimal rest. He demonstrated minimal to to no increase in pain this date. He demonstrates great reciprocal stair climbing both up and down with minimal difficulty. Pt will continue to benefit from therapy " to address strength and endurance deficits of LE.    Plan:   Continue to increase strength/endurance. Re-check sched 5/14.

## 2025-05-14 ENCOUNTER — TREATMENT (OUTPATIENT)
Dept: PHYSICAL THERAPY | Facility: CLINIC | Age: 75
End: 2025-05-14
Payer: MEDICARE

## 2025-05-14 DIAGNOSIS — M17.10 ARTHRITIS OF KNEE: Primary | ICD-10-CM

## 2025-05-14 PROCEDURE — 97110 THERAPEUTIC EXERCISES: CPT | Mod: GP

## 2025-05-14 NOTE — PROGRESS NOTES
Physical Therapy Treatment/Re-check    Patient Name: Mario Mendenhall  MRN: [id number]  Today's Date: 5/14/2025  Time Calculation  Start Time: 1000  Stop Time: 1015  Time Calculation (min): 15 min     PT Therapeutic Procedures Time Entry  Therapeutic Exercise Time Entry: 15        Insurance:   Visit#8/8  ANTHEM MEDICARE BOA COPAY 30 DED 0  COVERAGE 100 OOP 4150(280) AUTH REQ# [id number]  1 VISIT 04-01-25 TH    ANTHEM APPROVED  6  PT  VISITS  4-7-25 THRU 6-5-25  AUTH# [id number]  [id number]/ALL      Current Problem  1. Arthritis of knee              Subjective   General   Pt notes being able to do all daily tasks without limitations and notes feeling good since therapy. He states the only limitation is when attempting to climb multiple flights of stairs he notes some discomfort and fatigue but otherwise is able to complete with minimal limitation. He notes being able to walk dog frequently at least 1 mile and walk for 30 minutes minimum with no issues. He is confident in progressing independently at home and is very happy with progress in therapy.    Pain   0/10    Objective   LEFS: 74/80  Knee Musculoskeletal Exam  Gait    Gait is normal.    Inspection    Right      Right knee inspection is normal.      Left      Left knee inspection is normal.     Palpation    Right      Right knee palpation is unremarkable.      Left      Left knee palpation is unremarkable.      Range of Motion    Right      Right knee range of motion is normal and full.      Left      Left knee range of motion is normal and full.      Strength    Right      Right knee strength is normal.       Extension: 5/5. Extension is not affected by pain.       Flexion: 5/5. Flexion is not affected by pain.     Left      Left knee strength is normal.       Extension: 5/5. Extension is not affected by pain.       Flexion: 5/5. Flexion is not affected by pain.      Instability    Right      Instability signs: none - stable    Left      Instability signs: none -  "stable    Neurovascular    Right      Right knee neurovascular exam is normal.      Left      Left knee neurovascular exam is normal.      Special Signs    Right      Right knee special signs are normal.    Left      Left knee special signs are normal.    Treatments:  Objective re-assessment/measurements + HEP Review: 15'      EDUCATION:   Pt was educated to continue with current HEP a minimum of 2-3x per week to maintain current gains into the long-term. He was encouraged to progress accordingly as tolerated with the provided resistance bands and continue daily walking to maintain mobility and endurance. He was encouraged to progress step climbing independently to improve endurance and tolerance to increased stair climbing. Pt acknowledged good understanding and was in agreement to all above information.      Assessment:  Pt has shown significant gains in all domains including ROM, strength and functional mobility. He is able to perform all daily tasks without limitations despite limitations with prolonged stair climbing but this is a noted \"non-issue\" for him as he does not have to perform this activity daily. Pt has met all therapy goals and therefore no longer requires skilled therapy.    Plan:  Discharged from skilled therapy effective 5/14/25 as pt has met all therapy goals and no longer requires skilled therapy services    Goals:  Patient will improve L/R knee strength to >/=4+/5 for improved knee stability. (MET - pt demonstrates global 5/5 strength in jojo knee without pain)     Patient will be independent with HEP. (MET - pt has great recall and confidence in progressing HEP independently at home with no concerns)     Patient will improve LEFS score to >/= 65/80 to demonstrate increased independence and tolerance to bending/squatting activities. (MET - pt self-reports LEFS score 74/80 this date)     Patient will self-report increasing walking tolerance >/= 30 minutes to demonstrate improved stability and " decreased sensitivity at L knee joint. (MET - pt self-reports being able to walk 30 minutes minimum with dog without any issues)     Patient will demonstrate a symmetrical gait pattern without the L limp gait pattern to demonstrate improved tolerance to weightbearing positions. (MET - pt demonstrates normal gait pattern without any significant pain or deviations/compensations)

## 2025-07-03 DIAGNOSIS — I10 PRIMARY HYPERTENSION: ICD-10-CM

## 2025-07-03 RX ORDER — AMLODIPINE BESYLATE 10 MG/1
10 TABLET ORAL DAILY
Qty: 60 TABLET | Refills: 1 | Status: SHIPPED | OUTPATIENT
Start: 2025-07-03

## 2025-07-07 ENCOUNTER — APPOINTMENT (OUTPATIENT)
Dept: PHARMACY | Facility: HOSPITAL | Age: 75
End: 2025-07-07
Payer: MEDICARE

## 2025-07-07 DIAGNOSIS — E11.8 CONTROLLED TYPE 2 DIABETES MELLITUS WITH COMPLICATION, WITHOUT LONG-TERM CURRENT USE OF INSULIN (MULTI): ICD-10-CM

## 2025-07-07 DIAGNOSIS — E11.3293 TYPE 2 DIABETES MELLITUS WITH BOTH EYES AFFECTED BY MILD NONPROLIFERATIVE RETINOPATHY WITHOUT MACULAR EDEMA, WITHOUT LONG-TERM CURRENT USE OF INSULIN: Primary | ICD-10-CM

## 2025-07-07 NOTE — ASSESSMENT & PLAN NOTE
Patient's goal A1c is < 7%.  Is pt at goal? Yes, 6.6%  Patient's SMBGs are not recently checked- he does have supplies if needed.     Rationale for plan: Ed is feeling well on current therapy. He confirms he is getting Farxiga shipped monthly without issues. Has not recently checked sugars since he has been feeling well and last A1c at goal. He has supplies if needed for symptoms. Given he has been stable on these medications, okay for 6-month PharmD check-in and we will renew Farxiga application at that time.    Medication Changes:  CONTINUE  Farxiga 5 mg daily  Metformin  mg twice daily

## 2025-07-07 NOTE — PROGRESS NOTES
"      Patient ID: Mario Mendenhall \"Ed\" is a 75 y.o. male who presents for Diabetes.  Pt is here for Follow Up appointment.     PCP/Referring Provider: Brian Crow DO  Last Visit: 2/3/2025  Next visit: not yet scheduled    Verbal consent to manage patient's drug therapy was obtained from patient. They were informed they may decline to participate or withdraw from participation in pharmacy services at any time.     assistance for Farxiga approved. First shipment sent 1/22 to pt's home. Refill sent on 3/20/2025.    Subjective   Treatment Adherence:   Patient did take medications today.   Number of missed doses in last 7 days: 0  Can patient afford prescribed medications: No, Farxiga expensive. On  assistance at this time.    Preferred pharmacy:     [id number] [address] [facility]  [address] [facility] [address]  Phone: [phone] Fax: [phone]    [facility]  [address], [address]  [facility] [address]  Phone: [phone] Fax: [phone]    DIABETES MELLITUS TYPE 2:    Known diabetic complications: none.  Does patient follow with Endocrinology?: no   Eyes: sees Dr. De tomorrow  Podiatry: no cuts or sores, heals appropriately if he does note injury     Current diabetic medications include:  Farxiga 5mg daily  Metformin XR 500mg twice daily    Clarifications to above regimen: none  Adverse Effects: none reported     Home blood glucose records (mg/dL)  Current: often 140-150 last visit- A1c is at target; not checking recently but has supplies if symptoms arise    Any episodes of hypoglycemia? No, denies.    Did patient treat episode of hypoglycemia appropriately? N/A  Does the patient have a prescription for ready-to-use Glucagon? Not on insulin     Does pt have proteinuria? No   If appropriate, is patient on ACEi/ARB? N/A    Secondary Prevention  Statin? Yes  ACE-I/ARB? Yes  Aspirin? Yes    Pertinent PMH " Review:  PMH of Pancreatitis: No  PMH of Retinopathy: No  PMH of Urinary Tract Infections: No  PMH of MTC/MEN2: No    Lifestyle:  No changes, feeling very well    Last visit:  Currently once weekly PT for his knee in addition to baseline exercise    Objective     BP Readings from Last 4 Encounters:   02/03/25 150/80   01/22/25 (!) 183/100   12/16/24 132/80   09/27/24 144/85      There were no vitals filed for this visit.     Labs  CREATININE   Date Value Ref Range Status   03/19/2025 1.35 (H) 0.70 - 1.28 mg/dL Final     EGFR   Date Value Ref Range Status   03/19/2025 55 (L) > OR = 60 mL/min/1.73m2 Final     SODIUM   Date Value Ref Range Status   03/19/2025 141 135 - 146 mmol/L Final     POTASSIUM   Date Value Ref Range Status   03/19/2025 4.4 3.5 - 5.3 mmol/L Final     CHLORIDE   Date Value Ref Range Status   03/19/2025 104 98 - 110 mmol/L Final     UREA NITROGEN (BUN)   Date Value Ref Range Status   03/19/2025 22 7 - 25 mg/dL Final     AST   Date Value Ref Range Status   03/19/2025 17 10 - 35 U/L Final        Lab Results   Component Value Date    BILITOT 0.8 03/19/2025    CALCIUM 9.5 03/19/2025    CO2 26 03/19/2025     03/19/2025    CREATININE 1.35 (H) 03/19/2025    GLUCOSE 129 (H) 03/19/2025    ALKPHOS 52 03/19/2025    K 4.4 03/19/2025    PROT 6.9 03/19/2025     03/19/2025    AST 17 03/19/2025    ALT 21 03/19/2025    BUN 22 03/19/2025    ANIONGAP 11 03/19/2025    ALBUMIN 4.5 03/19/2025    GFRMALE 51 (A) 06/22/2023     Lab Results   Component Value Date    TRIG 188 (H) 03/19/2025    CHOL 212 (H) 03/19/2025    LDLCALC 112 (H) 03/19/2025    HDL 70 03/19/2025     Lab Results   Component Value Date    HGBA1C 6.6 (A) 02/03/2025       Current Outpatient Medications   Medication Instructions    amLODIPine (NORVASC) 10 mg, oral, Daily    aspirin 81 mg    atorvastatin (Lipitor) 40 mg tablet Take 1 tablet by mouth on Sundays, Tuesdays, Thursdays, and Saturdays. Take 1/2 tablet by mouth on Mondays, Wednesdays, and  Fridays.    benazepril (Lotensin) 20 mg tablet TAKE ONE TABLET BY MOUTH TWO TIMES A DAY    blood sugar diagnostic (OneTouch Ultra Test) strip Use to test sugars twice daily.    dapagliflozin propanediol (FARXIGA) 5 mg, oral, Daily    garlic 200 mg tablet 2 tablets, Daily    lancets misc Use to test sugars twice daily.    metFORMIN XR (GLUCOPHAGE-XR) 500 mg, oral, 2 times daily (morning and late afternoon), Do not crush, chew, or split.    metoprolol succinate XL (TOPROL-XL) 75 mg, oral, Daily    multivitamin tablet 1 tablet, Daily    omega 3-dha-epa-fish oil 1,200 (144-216) mg capsule Take by mouth.    timolol (Timoptic) 0.5 % ophthalmic solution instill 1 drop into both eyes every morning        DRUG INTERACTIONS:  None at time of review    Assessment/Plan   Problem List Items Addressed This Visit           ICD-10-CM    Type 2 diabetes mellitus with mild nonproliferative retinopathy of both eyes without macular edema - Primary E11.3293    Patient's goal A1c is < 7%.  Is pt at goal? Yes, 6.6%  Patient's SMBGs are not recently checked- he does have supplies if needed.     Rationale for plan: Ed is feeling well on current therapy. He confirms he is getting Farxiga shipped monthly without issues. Has not recently checked sugars since he has been feeling well and last A1c at goal. He has supplies if needed for symptoms. Given he has been stable on these medications, okay for 6-month PharmD check-in and we will renew Farxiga application at that time.    Medication Changes:  CONTINUE  Farxiga 5 mg daily  Metformin  mg twice daily         Relevant Orders    Referral to Clinical Pharmacy     Other Visit Diagnoses         Codes      Controlled type 2 diabetes mellitus with complication, without long-term current use of insulin (Multi)     E11.8    Relevant Orders    Referral to Clinical Pharmacy          Immunizations needed: RSV recommended    Labs ordered:  none     Referrals:  none     Follow-up: 1/19 1PM      Patient was provided with PharmD phone number and encouraged to reach out with any questions or concerns Prior to next appointment or ask provider for another pharmacy referral.    Thank you for allowing to take part in the care of this patient.    Maris Taveras, Mary  Clinical pharmacist  Phone number: [phone]    Continue all meds under the continuation of care with the referring provider and clinical pharmacy team.    Verbal consent to manage patient's drug therapy was obtained from the patient. They were informed they may decline to participate or withdraw from participation in pharmacy services at any time.

## 2025-07-18 ENCOUNTER — TELEPHONE (OUTPATIENT)
Dept: PRIMARY CARE | Facility: CLINIC | Age: 75
End: 2025-07-18

## 2025-07-23 ENCOUNTER — TELEPHONE (OUTPATIENT)
Dept: PRIMARY CARE | Facility: CLINIC | Age: 75
End: 2025-07-23
Payer: MEDICARE

## 2025-07-23 DIAGNOSIS — E11.3293 TYPE 2 DIABETES MELLITUS WITH BOTH EYES AFFECTED BY MILD NONPROLIFERATIVE RETINOPATHY WITHOUT MACULAR EDEMA, WITHOUT LONG-TERM CURRENT USE OF INSULIN: ICD-10-CM

## 2025-07-29 RX ORDER — DAPAGLIFLOZIN 5 MG/1
5 TABLET, FILM COATED ORAL DAILY
Qty: 30 TABLET | Refills: 0 | Status: SHIPPED | OUTPATIENT
Start: 2025-07-29 | End: 2026-07-29

## 2025-08-13 ENCOUNTER — APPOINTMENT (OUTPATIENT)
Dept: CARDIOLOGY | Facility: CLINIC | Age: 75
End: 2025-08-13
Payer: MEDICARE

## 2026-01-19 ENCOUNTER — APPOINTMENT (OUTPATIENT)
Dept: PHARMACY | Facility: HOSPITAL | Age: 76
End: 2026-01-19
Payer: MEDICARE

## 2026-01-21 ENCOUNTER — APPOINTMENT (OUTPATIENT)
Dept: CARDIOLOGY | Facility: CLINIC | Age: 76
End: 2026-01-21
Payer: MEDICARE